# Patient Record
Sex: MALE | Race: WHITE | NOT HISPANIC OR LATINO | Employment: UNEMPLOYED | URBAN - METROPOLITAN AREA
[De-identification: names, ages, dates, MRNs, and addresses within clinical notes are randomized per-mention and may not be internally consistent; named-entity substitution may affect disease eponyms.]

---

## 2018-06-09 ENCOUNTER — HOSPITAL ENCOUNTER (EMERGENCY)
Facility: HOSPITAL | Age: 38
Discharge: PSYCHIATRIC OR SUBSTANCE USE FACILITY | End: 2018-06-09
Attending: EMERGENCY MEDICINE
Payer: COMMERCIAL

## 2018-06-09 VITALS
DIASTOLIC BLOOD PRESSURE: 79 MMHG | HEART RATE: 59 BPM | HEIGHT: 72 IN | SYSTOLIC BLOOD PRESSURE: 136 MMHG | OXYGEN SATURATION: 99 % | TEMPERATURE: 98 F | RESPIRATION RATE: 18 BRPM | WEIGHT: 170 LBS | BODY MASS INDEX: 23.03 KG/M2

## 2018-06-09 DIAGNOSIS — R45.851 SUICIDAL IDEATIONS: Primary | ICD-10-CM

## 2018-06-09 DIAGNOSIS — F19.10 SUBSTANCE ABUSE: ICD-10-CM

## 2018-06-09 PROBLEM — F32.2 SEVERE DEPRESSION: Status: ACTIVE | Noted: 2018-06-09

## 2018-06-09 LAB
ALBUMIN SERPL BCP-MCNC: 4.4 G/DL
ALP SERPL-CCNC: 93 U/L
ALT SERPL W/O P-5'-P-CCNC: 24 U/L
AMPHET+METHAMPHET UR QL: NORMAL
ANION GAP SERPL CALC-SCNC: 8 MMOL/L
APAP SERPL-MCNC: <3 UG/ML
AST SERPL-CCNC: 19 U/L
BARBITURATES UR QL SCN>200 NG/ML: NEGATIVE
BASOPHILS # BLD AUTO: 0.05 K/UL
BASOPHILS NFR BLD: 0.6 %
BENZODIAZ UR QL SCN>200 NG/ML: NEGATIVE
BILIRUB SERPL-MCNC: 0.7 MG/DL
BILIRUB UR QL STRIP: NEGATIVE
BUN SERPL-MCNC: 9 MG/DL
BZE UR QL SCN: NEGATIVE
CALCIUM SERPL-MCNC: 9.7 MG/DL
CANNABINOIDS UR QL SCN: NEGATIVE
CHLORIDE SERPL-SCNC: 103 MMOL/L
CLARITY UR REFRACT.AUTO: CLEAR
CO2 SERPL-SCNC: 29 MMOL/L
COLOR UR AUTO: YELLOW
CREAT SERPL-MCNC: 0.9 MG/DL
CREAT UR-MCNC: 204 MG/DL
DIFFERENTIAL METHOD: ABNORMAL
EOSINOPHIL # BLD AUTO: 0.2 K/UL
EOSINOPHIL NFR BLD: 2 %
ERYTHROCYTE [DISTWIDTH] IN BLOOD BY AUTOMATED COUNT: 12.6 %
EST. GFR  (AFRICAN AMERICAN): >60 ML/MIN/1.73 M^2
EST. GFR  (NON AFRICAN AMERICAN): >60 ML/MIN/1.73 M^2
ETHANOL SERPL-MCNC: <10 MG/DL
GLUCOSE SERPL-MCNC: 91 MG/DL
GLUCOSE UR QL STRIP: NEGATIVE
HCT VFR BLD AUTO: 50.2 %
HGB BLD-MCNC: 17.1 G/DL
HGB UR QL STRIP: NEGATIVE
IMM GRANULOCYTES # BLD AUTO: 0.03 K/UL
IMM GRANULOCYTES NFR BLD AUTO: 0.3 %
KETONES UR QL STRIP: NEGATIVE
LEUKOCYTE ESTERASE UR QL STRIP: NEGATIVE
LYMPHOCYTES # BLD AUTO: 2.1 K/UL
LYMPHOCYTES NFR BLD: 24.4 %
MCH RBC QN AUTO: 32.3 PG
MCHC RBC AUTO-ENTMCNC: 34.1 G/DL
MCV RBC AUTO: 95 FL
METHADONE UR QL SCN>300 NG/ML: NEGATIVE
MONOCYTES # BLD AUTO: 0.4 K/UL
MONOCYTES NFR BLD: 4.3 %
NEUTROPHILS # BLD AUTO: 5.9 K/UL
NEUTROPHILS NFR BLD: 68.4 %
NITRITE UR QL STRIP: NEGATIVE
NRBC BLD-RTO: 0 /100 WBC
OPIATES UR QL SCN: NEGATIVE
PCP UR QL SCN>25 NG/ML: NEGATIVE
PH UR STRIP: 6 [PH] (ref 5–8)
PLATELET # BLD AUTO: 296 K/UL
PMV BLD AUTO: 9.7 FL
POTASSIUM SERPL-SCNC: 4.3 MMOL/L
PROT SERPL-MCNC: 7.8 G/DL
PROT UR QL STRIP: NEGATIVE
RBC # BLD AUTO: 5.3 M/UL
SODIUM SERPL-SCNC: 140 MMOL/L
SP GR UR STRIP: 1.02 (ref 1–1.03)
TOXICOLOGY INFORMATION: NORMAL
TSH SERPL DL<=0.005 MIU/L-ACNC: 1.83 UIU/ML
URN SPEC COLLECT METH UR: NORMAL
UROBILINOGEN UR STRIP-ACNC: NEGATIVE EU/DL
WBC # BLD AUTO: 8.65 K/UL

## 2018-06-09 PROCEDURE — 99285 EMERGENCY DEPT VISIT HI MDM: CPT

## 2018-06-09 PROCEDURE — 80053 COMPREHEN METABOLIC PANEL: CPT

## 2018-06-09 PROCEDURE — 25000003 PHARM REV CODE 250: Performed by: PHYSICIAN ASSISTANT

## 2018-06-09 PROCEDURE — 85025 COMPLETE CBC W/AUTO DIFF WBC: CPT

## 2018-06-09 PROCEDURE — 80320 DRUG SCREEN QUANTALCOHOLS: CPT

## 2018-06-09 PROCEDURE — 80329 ANALGESICS NON-OPIOID 1 OR 2: CPT

## 2018-06-09 PROCEDURE — S4991 NICOTINE PATCH NONLEGEND: HCPCS | Performed by: PHYSICIAN ASSISTANT

## 2018-06-09 PROCEDURE — 80307 DRUG TEST PRSMV CHEM ANLYZR: CPT

## 2018-06-09 PROCEDURE — 99285 EMERGENCY DEPT VISIT HI MDM: CPT | Mod: ,,, | Performed by: PHYSICIAN ASSISTANT

## 2018-06-09 PROCEDURE — 81003 URINALYSIS AUTO W/O SCOPE: CPT | Mod: 59

## 2018-06-09 PROCEDURE — 84443 ASSAY THYROID STIM HORMONE: CPT

## 2018-06-09 RX ORDER — IBUPROFEN 200 MG
1 TABLET ORAL
Status: DISCONTINUED | OUTPATIENT
Start: 2018-06-09 | End: 2018-06-09 | Stop reason: HOSPADM

## 2018-06-09 RX ADMIN — NICOTINE 1 PATCH: 21 PATCH, EXTENDED RELEASE TRANSDERMAL at 04:06

## 2018-06-09 NOTE — ED PROVIDER NOTES
"Encounter Date: 6/9/2018       History     Chief Complaint   Patient presents with    Suicidal     plan to hang self     Patient is a 38-year-old male with past medical history of ADHD no longer on Vyvanse for approximately 6 months who presents the ED for suicidal ideations and plan.  Patient states the past 3 days, he has been having thoughts of suicidal ideation and plan.  He states that he planned on hanging himself from the beam in his house where no one would be able to get hime.  He was brought in by his wife and his father.  His wife states that they agree to bring him in today because his thoughts were very "lucid".  The patient has been off of Vyvanse for approximately 6 months but does admit to previously abusing it. As a replacement, he started abusing crystal meth. In addition, he recently lost his job and has been at home for the past week. He states that he sleeps all the time. Wife is aware of patient's recently mood change, and patient agreed to come into the hospital for help due to his worsening active thoughts. He had been verbal abusive to his wife due to his mood change and states that it isn't fair to her. No homicidal ideations. History of ETOH abuse, but none currently. Patient denies any physical pain such as headache, chest pain, back pain, or abdominal pain, vomiting, diarrhea.           Review of patient's allergies indicates:  No Known Allergies  Past Medical History:   Diagnosis Date    Addiction to drug     ADHD (attention deficit hyperactivity disorder), inattentive type     Treated by Dr. Tao in past - records in media file    History of psychiatric hospitalization     Withdrawal symptoms, alcohol      Past Surgical History:   Procedure Laterality Date    CYST REMOVAL      from around the left ear    HERNIA REPAIR      left inguinal herna    WISDOM TOOTH EXTRACTION       Family History   Problem Relation Age of Onset    Cancer Mother 50        skin cancer    Cancer " Father 45        bladder cancer x4    Muscular dystrophy Father 42    No Known Problems Sister     No Known Problems Sister      Social History   Substance Use Topics    Smoking status: Current Every Day Smoker     Packs/day: 2.00     Years: 15.00     Types: Cigarettes    Smokeless tobacco: Never Used    Alcohol use No      Comment: socially     Review of Systems   Constitutional: Negative for chills and fever.   HENT: Negative for ear pain and sore throat.    Eyes: Negative for visual disturbance.   Respiratory: Negative for cough and shortness of breath.    Cardiovascular: Negative for chest pain and leg swelling.   Gastrointestinal: Negative for abdominal pain, nausea and vomiting.   Endocrine: Negative for polyuria.   Genitourinary: Negative for dysuria and hematuria.   Musculoskeletal: Negative for back pain.   Skin: Negative for rash.   Neurological: Negative for weakness.   Hematological: Does not bruise/bleed easily.   Psychiatric/Behavioral: Positive for behavioral problems, dysphoric mood, sleep disturbance (hypersomnia) and suicidal ideas. Negative for hallucinations and self-injury.       Physical Exam     Initial Vitals [06/09/18 1315]   BP Pulse Resp Temp SpO2   (!) 141/75 67 18 97.9 °F (36.6 °C) 99 %      MAP       97         Physical Exam    Nursing note and vitals reviewed.  Constitutional: He appears well-developed and well-nourished. He is not diaphoretic. No distress.   Pleasant male in NAD and nontoxic.   HENT:   Head: Normocephalic and atraumatic.   Nose: Nose normal.   Eyes: Conjunctivae and EOM are normal.   Neck: Normal range of motion.   Cardiovascular: Normal rate, regular rhythm and normal heart sounds. Exam reveals no friction rub.    No murmur heard.  Pulmonary/Chest: Breath sounds normal. No respiratory distress. He has no wheezes. He has no rales.   Abdominal: Soft. Bowel sounds are normal. He exhibits no distension. There is no tenderness.   Musculoskeletal: Normal range of  motion.   Neurological: He is alert and oriented to person, place, and time. He has normal strength. No sensory deficit.   Skin: Skin is warm and dry. No erythema.   Psychiatric: He has a normal mood and affect. His speech is not slurred. He is not agitated, not slowed and not actively hallucinating. Thought content is not paranoid and not delusional. Cognition and memory are not impaired. He does not express impulsivity. He expresses suicidal ideation. He expresses no homicidal ideation. He expresses suicidal plans. He expresses no homicidal plans.   Tearful at times. Calm and cooperative. Patient appears very honest.          ED Course   Procedures  Labs Reviewed   CBC W/ AUTO DIFFERENTIAL - Abnormal; Notable for the following:        Result Value    MCH 32.3 (*)     All other components within normal limits   ACETAMINOPHEN LEVEL - Abnormal; Notable for the following:     Acetaminophen (Tylenol), Serum <3.0 (*)     All other components within normal limits   COMPREHENSIVE METABOLIC PANEL   TSH   ALCOHOL,MEDICAL (ETHANOL)   URINALYSIS, REFLEX TO URINE CULTURE    Narrative:     Preferred Collection Type->Urine, Clean Catch   DRUG SCREEN PANEL, URINE EMERGENCY    Narrative:     Preferred Collection Type->Urine, Clean Catch          No orders to display        Medical Decision Making:   History:   Old Medical Records: I decided to obtain old medical records.  Clinical Tests:   Lab Tests: Reviewed and Ordered       APC / Resident Notes:   Patient is a 38-year-old male presents the ED with suicide ideations and plan.  Patient states that he had clear and very detailed thought about hanging himself today from the beam in his house.  Patient's wife and father is with him at this time.    On exam, vital signs stable.  NAD and nontoxic appearing.  Normal mood and affect.  Patient is very honest.  Tearful at times when discussing his thoughts.  Suicidal ideations and plan.  No homicidal ideations, hallucinations, or  paranoia. Clinically sober.     Will PEC patient, order routine blood work, and planned to call Psychiatry as they may have a bed open.    UA with no signs of infection.  CBC with no leukocytosis.    CMP with no elec abnormalities. Cr stable at 0.9. No hyperbilirubinemia or transaminitis.   TSH wnl at 1.8.  Negative APAP level.  Negative ethanol level.    Drug screen presumptive positive for amphetamine.  He reports last use of crystal meth 1 week ago.    2:29 PM  Patient medically cleared. Will consult psychiatry.    3:04 PM  No beds availible in psych unit here. Will find placement and transfer. Patient prefers to be somewhere near home like the new [facility].    Update: patient transferred to [facility].                 Clinical Impression:   The primary encounter diagnosis was Suicidal ideations. A diagnosis of Substance abuse was also pertinent to this visit.      Disposition:   Disposition: Transferred                        Grace Bonds PA-C  06/10/18 0923

## 2018-06-09 NOTE — ED NOTES
Pt updated on plan of care. Pt told he has been accepted to [facility]. All questions answered.

## 2018-06-09 NOTE — ED TRIAGE NOTES
"Pt states " I feel like killing myself"  States he had these thoughts yesterday but they were intense today   "

## 2018-06-09 NOTE — ED NOTES
Pt arrives to [facility] escorted by RN and security. Pt arrives with no belongings. Spouse took all pt's belongings. Pt is searched for contraband, none found. Pt is calm, compliant, with flat affect. Pt reports feeling hopeless and irritable with multiple life stressors. Pt denies HI/AV hallucinations. Pt denies paranoia. Pt does report feeling suicidal this morning, states he was consumed with thoughts of self harm. Pt willingly came to ED with spouse. Pt denies other medical hx or psych hx. States he was taking vyvanse up until a few months ago for ADHD. Pt states he began to abuse it so he took himself of it, then started doing crystal meth. Pt states he has not done crystal meth in weeks. Denies other drug use or significant alcohol abuse. States he smokes 1.5 packs cigarettes a day. Pt is educated on plan of care and PEC process. All questions answered. Jesscia from 's office made aware of room change.

## 2018-06-09 NOTE — ED NOTES
Kishor Baer is at bedside and is charting on every 15 min observation form Kishor does not have personal belongings with her.

## 2018-06-09 NOTE — ED NOTES
[facility] transportation scheduled through [facility]  ETA is 1815.

## 2018-06-09 NOTE — ED NOTES
Pt escorted to [facility] vehicle by RN and security. All pertinent paperwork given to [facility] drivers. Pt does not have any belongings with him. Pt requested RN to notify Spouse Yue of transfer. Yue is notified by [facility] and is given [facility] phone number. Pt is calm, compliant, no signs of distress. Jessica from coroners notified of transfer. Paolo from [facility] notified of departure.

## 2018-06-09 NOTE — ED NOTES
Pt has been accepted to [facility] per Leslie. Pt has been accepted under the care of Vandana Marie NP. [facility] nurse to call report in 20 minutes to [phone].

## 2018-06-09 NOTE — ED NOTES
PEC packet faxed to [facility], [facility] and [facility]. [facility] intake reports no male beds open.

## 2018-06-09 NOTE — ED NOTES
Pt states he used crystal meth one week ago States he recently had a change in employment , had a mild confrontation with his original employer and tried to get hired by another company but it did not work out. States had not taken his Vyvance in 6 months and abused it  He then substituted his Vyvance for crystal meth. States he has been depressed while coming off of the cyrstal meth , gets verbally abusive towards his wife, and feels useless.

## 2018-06-09 NOTE — ED NOTES
Pt identifiers Reynaldo Pinto III were checked and are correct  LOC: The patient is awake, alert, aware of environment Pt appears tearful at times  Oriented x4 , speaking appropriately  APPEARANCE: Pt resting comfortably, in no acute distress, pt is clean and well groomed, clothing properly fastened  SKIN: Skin warm, dry and intact, normal skin turgor, moist mucus membranes  RESPIRATORY: Airway is open and patent, respirations are spontaneous, even and unlabored, normal effort and rate  Breath sounds clear roseanna to all lung fields on auscultation   CARDIAC: Normal rate and rhythm, no peripheral edema noted, capillary refill < 3 seconds, bilateral radial pulses 2+  ABDOMEN: Soft, nontender, nondistended. Bowel sounds present to all four quad of abd on auscultation  NEUROLOGIC: PERRL, facial expression is symmetrical, patient moving all extremities spontaneously, normal sensation in all extremities when touched with a finger.  Follows all commands appropriately  MUSCULOSKELETAL: No obvious deformities.

## 2018-06-11 PROBLEM — S61.200A OPEN WOUND OF RIGHT INDEX FINGER WITHOUT DAMAGE TO NAIL: Status: ACTIVE | Noted: 2018-06-11

## 2018-06-11 PROBLEM — F17.200 NICOTINE DEPENDENCE: Status: ACTIVE | Noted: 2018-06-11

## 2020-06-16 ENCOUNTER — OFFICE VISIT (OUTPATIENT)
Dept: INTERNAL MEDICINE | Facility: CLINIC | Age: 40
End: 2020-06-16
Payer: COMMERCIAL

## 2020-06-16 VITALS
HEIGHT: 72 IN | HEART RATE: 88 BPM | WEIGHT: 186 LBS | DIASTOLIC BLOOD PRESSURE: 78 MMHG | BODY MASS INDEX: 25.19 KG/M2 | OXYGEN SATURATION: 98 % | SYSTOLIC BLOOD PRESSURE: 120 MMHG

## 2020-06-16 DIAGNOSIS — Z72.0 TOBACCO USER: ICD-10-CM

## 2020-06-16 DIAGNOSIS — Z11.4 ENCOUNTER FOR SCREENING FOR HIV: ICD-10-CM

## 2020-06-16 DIAGNOSIS — Z01.89 ROUTINE LAB DRAW: ICD-10-CM

## 2020-06-16 DIAGNOSIS — Z63.0 MARITAL PROBLEM: ICD-10-CM

## 2020-06-16 DIAGNOSIS — Z13.220 SCREENING CHOLESTEROL LEVEL: ICD-10-CM

## 2020-06-16 DIAGNOSIS — E66.3 OVERWEIGHT (BMI 25.0-29.9): ICD-10-CM

## 2020-06-16 DIAGNOSIS — Z23 NEED FOR 23-POLYVALENT PNEUMOCOCCAL POLYSACCHARIDE VACCINE: ICD-10-CM

## 2020-06-16 DIAGNOSIS — Z00.00 ENCOUNTER FOR HEALTH MAINTENANCE EXAMINATION: Primary | ICD-10-CM

## 2020-06-16 PROCEDURE — 99999 PR PBB SHADOW E&M-EST. PATIENT-LVL IV: CPT | Mod: PBBFAC,,, | Performed by: NURSE PRACTITIONER

## 2020-06-16 PROCEDURE — 90471 IMMUNIZATION ADMIN: CPT | Mod: S$GLB,,, | Performed by: NURSE PRACTITIONER

## 2020-06-16 PROCEDURE — 99385 PR PREVENTIVE VISIT,NEW,18-39: ICD-10-PCS | Mod: 25,S$GLB,, | Performed by: NURSE PRACTITIONER

## 2020-06-16 PROCEDURE — 90732 PPSV23 VACC 2 YRS+ SUBQ/IM: CPT | Mod: S$GLB,,, | Performed by: NURSE PRACTITIONER

## 2020-06-16 PROCEDURE — 90471 PNEUMOCOCCAL POLYSACCHARIDE VACCINE 23-VALENT =>2YO SQ IM: ICD-10-PCS | Mod: S$GLB,,, | Performed by: NURSE PRACTITIONER

## 2020-06-16 PROCEDURE — 99999 PR PBB SHADOW E&M-EST. PATIENT-LVL IV: ICD-10-PCS | Mod: PBBFAC,,, | Performed by: NURSE PRACTITIONER

## 2020-06-16 PROCEDURE — 90732 PNEUMOCOCCAL POLYSACCHARIDE VACCINE 23-VALENT =>2YO SQ IM: ICD-10-PCS | Mod: S$GLB,,, | Performed by: NURSE PRACTITIONER

## 2020-06-16 PROCEDURE — 99385 PREV VISIT NEW AGE 18-39: CPT | Mod: 25,S$GLB,, | Performed by: NURSE PRACTITIONER

## 2020-06-16 SDOH — SOCIAL DETERMINANTS OF HEALTH (SDOH): PROBLEMS IN RELATIONSHIP WITH SPOUSE OR PARTNER: Z63.0

## 2020-06-16 NOTE — PATIENT INSTRUCTIONS
Fasting labs ordered and drawn today, will call with results, if results ok, RTC in 1 yr for annual or sooner prn with one of MDs I work with who can be your new PCP: Dr. Mary Anne Rob, Dr. Portillo Hernandez, Dr. Keyona Jimenez, Dr. Von Shah, Dr. Jarred Lezama, Dr. Betito Sprague    PPSV 23 vaccine today    Referral to behavioral health to discuss marital issues

## 2020-06-16 NOTE — PROGRESS NOTES
"Subjective:       Patient ID: Reynaldo Pinto III is a 40 y.o. male.    Chief Complaint: Annual Exam    Pt new to me, here for "overall health".    Previous  PCP was someone in [place] he states.    Requesting a referral to psychiatry for his marital issues.    There is a hx of depression and substance abuse, he has been clean for 14 months and not having issues with this.    He stopped smoking 3 weeks ago, using nicotine patches.    Review of Systems   Constitutional: Negative for activity change, appetite change and unexpected weight change.   HENT: Negative for hearing loss and voice change.    Eyes: Negative for visual disturbance.   Respiratory: Negative for apnea, cough, chest tightness and shortness of breath.    Cardiovascular: Negative for chest pain, palpitations and leg swelling.   Gastrointestinal: Negative for abdominal distention, abdominal pain, blood in stool, constipation, diarrhea, nausea and vomiting.   Endocrine: Negative for cold intolerance, heat intolerance, polydipsia, polyphagia and polyuria.   Genitourinary: Negative for difficulty urinating, dysuria and penile pain.   Musculoskeletal: Negative for arthralgias and myalgias.   Integumentary:  Negative for color change, pallor, rash and wound.   Allergic/Immunologic: Negative for environmental allergies and food allergies.   Neurological: Negative for dizziness and weakness.   Hematological: Negative for adenopathy. Does not bruise/bleed easily.   Psychiatric/Behavioral: Negative for agitation, behavioral problems, sleep disturbance and suicidal ideas.     Review of patient's allergies indicates:  No Known Allergies      Current Outpatient Medications:     nicotine (NICODERM CQ) 21 mg/24 hr, Place 1 patch onto the skin once daily., Disp: , Rfl:     Patient Active Problem List   Diagnosis    Erectile dysfunction    ADHD (attention deficit hyperactivity disorder), inattentive type    Tobacco user    Severe depression    Open wound of right " index finger without damage to nail    Nicotine dependence     Past Medical History:   Diagnosis Date    Addiction to drug     ADHD (attention deficit hyperactivity disorder), inattentive type     Treated by Dr. Tao in past - records in media file    History of psychiatric hospitalization     Withdrawal symptoms, alcohol      Past Surgical History:   Procedure Laterality Date    CYST REMOVAL      from around the left ear    HERNIA REPAIR      left inguinal herna    WISDOM TOOTH EXTRACTION       Social History     Socioeconomic History    Marital status:      Spouse name: Not on file    Number of children: Not on file    Years of education: Not on file    Highest education level: Not on file   Occupational History    Not on file   Social Needs    Financial resource strain: Not on file    Food insecurity     Worry: Not on file     Inability: Not on file    Transportation needs     Medical: Not on file     Non-medical: Not on file   Tobacco Use    Smoking status: Former Smoker     Packs/day: 2.00     Years: 15.00     Pack years: 30.00     Types: Cigarettes     Quit date: 2020     Years since quittin.0    Smokeless tobacco: Never Used   Substance and Sexual Activity    Alcohol use: No     Alcohol/week: 0.0 standard drinks     Comment: socially    Drug use: Not Currently     Types: Methamphetamines     Comment: clean 18 months    Sexual activity: Yes     Partners: Female   Lifestyle    Physical activity     Days per week: Not on file     Minutes per session: Not on file    Stress: Not on file   Relationships    Social connections     Talks on phone: Not on file     Gets together: Not on file     Attends Holiness service: Not on file     Active member of club or organization: Not on file     Attends meetings of clubs or organizations: Not on file     Relationship status: Not on file   Other Topics Concern    Patient feels they ought to cut down on drinking/drug use Not Asked     Patient annoyed by others criticizing their drinking/drug use Not Asked    Patient has felt bad or guilty about drinking/drug use Not Asked    Patient has had a drink/used drugs as an eye opener in the AM Not Asked   Social History Narrative    Not on file     Family History   Problem Relation Age of Onset    Cancer Mother 50        skin cancer    Cancer Father 45        bladder cancer x4    Muscular dystrophy Father 42    No Known Problems Sister     No Known Problems Sister        Objective:       Vitals:    06/16/20 1528   BP: 120/78   Pulse: 88   SpO2: 98%   Weight: 84.4 kg (186 lb)   Height: 6' (1.829 m)   PainSc: 0-No pain     Body mass index is 25.23 kg/m².    Physical Exam  Vitals signs and nursing note reviewed.   Constitutional:       Appearance: Normal appearance. He is well-developed.      Comments: overweight   HENT:      Head: Normocephalic.      Right Ear: Tympanic membrane, ear canal and external ear normal.      Left Ear: Tympanic membrane, ear canal and external ear normal.      Nose: Nose normal.      Mouth/Throat:      Mouth: Mucous membranes are moist.      Pharynx: Oropharynx is clear.   Eyes:      General: Lids are normal. Lids are everted, no foreign bodies appreciated.      Conjunctiva/sclera: Conjunctivae normal.      Pupils: Pupils are equal, round, and reactive to light.   Neck:      Musculoskeletal: Full passive range of motion without pain, normal range of motion and neck supple.      Vascular: No carotid bruit or JVD.      Trachea: Trachea normal.   Cardiovascular:      Rate and Rhythm: Normal rate and regular rhythm.      Pulses: Normal pulses.      Heart sounds: Normal heart sounds.   Pulmonary:      Effort: Pulmonary effort is normal.      Breath sounds: Normal breath sounds.   Abdominal:      General: Abdomen is flat. Bowel sounds are normal.      Palpations: Abdomen is soft.   Musculoskeletal: Normal range of motion.   Skin:     General: Skin is warm and dry.       Capillary Refill: Capillary refill takes less than 2 seconds.   Neurological:      General: No focal deficit present.      Mental Status: He is alert and oriented to person, place, and time.   Psychiatric:         Mood and Affect: Mood normal.         Speech: Speech normal.         Behavior: Behavior normal.         Thought Content: Thought content normal.         Judgment: Judgment normal.         Assessment:       1. Encounter for health maintenance examination    2. Routine lab draw    3. Screening cholesterol level    4. Encounter for screening for HIV    5. Need for 23-polyvalent pneumococcal polysaccharide vaccine    6. Tobacco user    7. BMI 25.0-25.9,adult    8. Overweight (BMI 25.0-29.9)    9. Marital problem        Plan:       Reynaldo was seen today for annual exam.    Diagnoses and all orders for this visit:    Encounter for health maintenance examination  Annual wellness exam completed.    All medications, histories, and concerns reviewed, reconciled, and addressed.    Appropriate Screenings per pt's sex and age have been reviewed and discussed with pt.    BMI reviewed.    Routine lab draw  -     CBC auto differential; Future  -     Comprehensive metabolic panel; Future  -     Lipid Panel; Future  -     HIV 1/2 Ag/Ab (4th Gen); Future  -     TSH; Future  -     Urinalysis; Future    Screening cholesterol level  -     Lipid Panel; Future    Encounter for screening for HIV  -     HIV 1/2 Ag/Ab (4th Gen); Future    Need for 23-polyvalent pneumococcal polysaccharide vaccine  -     (In Office Administered) Pneumococcal Polysaccharide Vaccine (23 Valent) (SQ/IM)    Tobacco user  -     (In Office Administered) Pneumococcal Polysaccharide Vaccine (23 Valent) (SQ/IM)    BMI 25.0-25.9,adult    Overweight (BMI 25.0-29.9)    Marital problem  -     Ambulatory referral/consult to Behavioral Health; Future    Fasting labs ordered and drawn today, will call with results, if results ok, RTC in 1 yr for annual or sooner prn  with one of MDs I work with who can be your new PCP: Dr. Mary Anne Rob, Dr. Portillo Hernandez, Dr. Keyona Jimenez, Dr. Von Shah, Dr. Jarred Lezama, Dr. Betito Sprague    PPSV 23 vaccine today    Referral to behavioral health to discuss marital issues    Follow up in about 1 year (around 6/16/2021) for for annual or sooner as needed with one of MDs recommended on AVS.

## 2020-06-16 NOTE — LETTER
June 16, 2020      [facility]  [address]  [facility] [address]  Phone: [phone]  Fax: [phone]       Patient: Reynaldo Pinto   YOB: 1980  Date of Visit: 06/16/2020    To Whom It May Concern:    Michael Pinto  was at [facility] on 06/16/2020. He may return to work/school on 06/18/2020 with no restrictions. If you have any questions or concerns, or if I can be of further assistance, please do not hesitate to contact me.    Sincerely,    Kia Navarro DNP

## 2020-06-17 ENCOUNTER — LAB VISIT (OUTPATIENT)
Dept: LAB | Facility: HOSPITAL | Age: 40
End: 2020-06-17
Attending: NURSE PRACTITIONER
Payer: COMMERCIAL

## 2020-06-17 DIAGNOSIS — Z01.89 ROUTINE LAB DRAW: ICD-10-CM

## 2020-06-17 LAB
BILIRUB UR QL STRIP: NEGATIVE
CLARITY UR REFRACT.AUTO: CLEAR
COLOR UR AUTO: YELLOW
GLUCOSE UR QL STRIP: NEGATIVE
HGB UR QL STRIP: NEGATIVE
KETONES UR QL STRIP: NEGATIVE
LEUKOCYTE ESTERASE UR QL STRIP: NEGATIVE
NITRITE UR QL STRIP: NEGATIVE
PH UR STRIP: 6 [PH] (ref 5–8)
PROT UR QL STRIP: NEGATIVE
SP GR UR STRIP: 1.03 (ref 1–1.03)
URN SPEC COLLECT METH UR: NORMAL

## 2020-06-17 PROCEDURE — 81003 URINALYSIS AUTO W/O SCOPE: CPT

## 2020-06-19 ENCOUNTER — TELEPHONE (OUTPATIENT)
Dept: INTERNAL MEDICINE | Facility: CLINIC | Age: 40
End: 2020-06-19

## 2020-06-19 NOTE — TELEPHONE ENCOUNTER
Left patient a voicemail letting him know that check out would have been the ones to give him a list of behavioral health doctors. Advised him to call when he is ready to schedule his first appt as order is already in from last visit.

## 2021-04-12 ENCOUNTER — PATIENT MESSAGE (OUTPATIENT)
Dept: RESEARCH | Facility: HOSPITAL | Age: 41
End: 2021-04-12

## 2022-01-25 ENCOUNTER — DOCUMENTATION ONLY (OUTPATIENT)
Dept: INTERNAL MEDICINE | Facility: CLINIC | Age: 42
End: 2022-01-25

## 2022-01-25 ENCOUNTER — OFFICE VISIT (OUTPATIENT)
Dept: INTERNAL MEDICINE | Facility: CLINIC | Age: 42
End: 2022-01-25
Payer: COMMERCIAL

## 2022-01-25 VITALS
BODY MASS INDEX: 26.78 KG/M2 | OXYGEN SATURATION: 97 % | DIASTOLIC BLOOD PRESSURE: 74 MMHG | HEART RATE: 80 BPM | SYSTOLIC BLOOD PRESSURE: 102 MMHG | TEMPERATURE: 99 F | HEIGHT: 72 IN | WEIGHT: 197.75 LBS

## 2022-01-25 DIAGNOSIS — R31.9 HEMATURIA, UNSPECIFIED TYPE: ICD-10-CM

## 2022-01-25 DIAGNOSIS — L70.9 ACNE, UNSPECIFIED ACNE TYPE: ICD-10-CM

## 2022-01-25 DIAGNOSIS — Z11.59 SCREENING FOR VIRAL DISEASE: ICD-10-CM

## 2022-01-25 DIAGNOSIS — N52.9 ERECTILE DYSFUNCTION, UNSPECIFIED ERECTILE DYSFUNCTION TYPE: ICD-10-CM

## 2022-01-25 DIAGNOSIS — R19.7 DIARRHEA, UNSPECIFIED TYPE: ICD-10-CM

## 2022-01-25 DIAGNOSIS — K92.1 HEMATOCHEZIA: ICD-10-CM

## 2022-01-25 DIAGNOSIS — Z00.00 ANNUAL PHYSICAL EXAM: Primary | ICD-10-CM

## 2022-01-25 PROCEDURE — 3078F PR MOST RECENT DIASTOLIC BLOOD PRESSURE < 80 MM HG: ICD-10-PCS | Mod: CPTII,S$GLB,, | Performed by: INTERNAL MEDICINE

## 2022-01-25 PROCEDURE — 99999 PR PBB SHADOW E&M-EST. PATIENT-LVL V: CPT | Mod: PBBFAC,,, | Performed by: INTERNAL MEDICINE

## 2022-01-25 PROCEDURE — 1160F PR REVIEW ALL MEDS BY PRESCRIBER/CLIN PHARMACIST DOCUMENTED: ICD-10-PCS | Mod: CPTII,S$GLB,, | Performed by: INTERNAL MEDICINE

## 2022-01-25 PROCEDURE — 3074F SYST BP LT 130 MM HG: CPT | Mod: CPTII,S$GLB,, | Performed by: INTERNAL MEDICINE

## 2022-01-25 PROCEDURE — 3078F DIAST BP <80 MM HG: CPT | Mod: CPTII,S$GLB,, | Performed by: INTERNAL MEDICINE

## 2022-01-25 PROCEDURE — 99999 PR PBB SHADOW E&M-EST. PATIENT-LVL V: ICD-10-PCS | Mod: PBBFAC,,, | Performed by: INTERNAL MEDICINE

## 2022-01-25 PROCEDURE — 3008F PR BODY MASS INDEX (BMI) DOCUMENTED: ICD-10-PCS | Mod: CPTII,S$GLB,, | Performed by: INTERNAL MEDICINE

## 2022-01-25 PROCEDURE — 99395 PREV VISIT EST AGE 18-39: CPT | Mod: S$GLB,,, | Performed by: INTERNAL MEDICINE

## 2022-01-25 PROCEDURE — 1159F PR MEDICATION LIST DOCUMENTED IN MEDICAL RECORD: ICD-10-PCS | Mod: CPTII,S$GLB,, | Performed by: INTERNAL MEDICINE

## 2022-01-25 PROCEDURE — 99395 PR PREVENTIVE VISIT,EST,18-39: ICD-10-PCS | Mod: S$GLB,,, | Performed by: INTERNAL MEDICINE

## 2022-01-25 PROCEDURE — 3074F PR MOST RECENT SYSTOLIC BLOOD PRESSURE < 130 MM HG: ICD-10-PCS | Mod: CPTII,S$GLB,, | Performed by: INTERNAL MEDICINE

## 2022-01-25 PROCEDURE — 1159F MED LIST DOCD IN RCRD: CPT | Mod: CPTII,S$GLB,, | Performed by: INTERNAL MEDICINE

## 2022-01-25 PROCEDURE — 1160F RVW MEDS BY RX/DR IN RCRD: CPT | Mod: CPTII,S$GLB,, | Performed by: INTERNAL MEDICINE

## 2022-01-25 PROCEDURE — 3008F BODY MASS INDEX DOCD: CPT | Mod: CPTII,S$GLB,, | Performed by: INTERNAL MEDICINE

## 2022-01-25 RX ORDER — TADALAFIL 5 MG/1
5 TABLET ORAL DAILY PRN
Qty: 30 TABLET | Refills: 3 | Status: SHIPPED | OUTPATIENT
Start: 2022-01-25 | End: 2022-01-25 | Stop reason: SDUPTHER

## 2022-01-25 RX ORDER — TADALAFIL 5 MG/1
5 TABLET ORAL DAILY PRN
COMMUNITY
End: 2022-01-25 | Stop reason: SDUPTHER

## 2022-01-25 RX ORDER — TADALAFIL 5 MG/1
5 TABLET ORAL DAILY PRN
Qty: 30 TABLET | Refills: 3 | Status: SHIPPED | OUTPATIENT
Start: 2022-01-25 | End: 2022-07-27 | Stop reason: SDUPTHER

## 2022-01-25 NOTE — PROGRESS NOTES
[facility] Note    Chief Complaint      Chief Complaint   Patient presents with    Establish Care       History of Present Illness      Reynaldo Pinto III is a 41 y.o. male with chronic conditions of erectile dysfunction who presents today for: establish care and annual preventative visit. Complains of loose bowel movements 2-3x/day, usually after eating, occasionally with blood.  Not associated with abdominal pain.  Has history of bladder cancer.    ED: Controlled on cialis.    Diet: Prepares own food mostly.  Limiting foods and carbs.  Drinks plenty water.  Exercise: Stays active with work as [employer]  Denies drinking and driving, drinking more than 4 drinks on occasion, drug use.    Flu shot declines.  TdAP 2017. COVID vaccine declines.  Shingrix due age 52.  Pneumonia vaccine due age 67.  Cscope and PSA due age 47.    Past Medical History:  Past Medical History:   Diagnosis Date    Addiction to drug     ADHD (attention deficit hyperactivity disorder), inattentive type     Treated by Dr. Tao in past - records in media file    History of psychiatric hospitalization     Withdrawal symptoms, alcohol        Past Surgical History:   has a past surgical history that includes Cyst Removal; Hernia repair; and [place] tooth extraction.    Family History:  family history includes Cancer (age of onset: 45) in his father; Cancer (age of onset: 50) in his mother; Muscular dystrophy (age of onset: 42) in his father; No Known Problems in his sister and sister.     Social History:  Social History     Tobacco Use    Smoking status: Former Smoker     Packs/day: 2.00     Years: 15.00     Pack years: 30.00     Types: Cigarettes     Quit date: 2020     Years since quittin.6    Smokeless tobacco: Never Used   Substance Use Topics    Alcohol use: No     Alcohol/week: 0.0 standard drinks     Comment: socially    Drug use: Not Currently     Types: Methamphetamines     Comment: clean 18  months       I personally reviewed all past medical, surgical, social and family history.    Review of Systems   Constitutional: Negative for chills, fever and malaise/fatigue.   Respiratory: Negative for shortness of breath.    Cardiovascular: Negative for chest pain.   Gastrointestinal: Negative for constipation, diarrhea, nausea and vomiting.   Skin: Negative for rash.   Neurological: Negative for weakness.   All other systems reviewed and are negative.       Medications:  Outpatient Encounter Medications as of 1/25/2022   Medication Sig Dispense Refill    [DISCONTINUED] tadalafiL (CIALIS) 5 MG tablet Take 5 mg by mouth daily as needed for Erectile Dysfunction.      [DISCONTINUED] tadalafiL (CIALIS) 5 MG tablet Take 1 tablet (5 mg total) by mouth daily as needed for Erectile Dysfunction. 30 tablet 3    nicotine (NICODERM CQ) 21 mg/24 hr Place 1 patch onto the skin once daily. (Patient not taking: Reported on 1/25/2022)      tadalafiL (CIALIS) 5 MG tablet Take 1 tablet (5 mg total) by mouth daily as needed for Erectile Dysfunction. 30 tablet 3     No facility-administered encounter medications on file as of 1/25/2022.       Allergies:  Review of patient's allergies indicates:  No Known Allergies    Health Maintenance:  Immunization History   Administered Date(s) Administered    Hepatitis A / Hepatitis B 12/14/2018    Influenza - Quadrivalent - PF *Preferred* (6 months and older) 09/20/2016    Meningococcal Conjugate (MCV4P) 12/01/2008, 12/31/2008    Pneumococcal Polysaccharide - 23 Valent 06/16/2020    Td (ADULT) 12/01/2008    Tdap 12/31/2008, 05/17/2017      Health Maintenance   Topic Date Due    Hepatitis C Screening  Never done    Lipid Panel  06/17/2025    TETANUS VACCINE  01/01/2029        Physical Exam      Vital Signs  Temp: 98.7 °F (37.1 °C)  Pulse: 80  SpO2: 97 %  BP: 102/74  BP Location: Left arm  Patient Position: Sitting  Pain Score: 0-No pain  Height and Weight  Height: 6' (182.9  cm)  Weight: 89.7 kg (197 lb 12 oz)  BSA (Calculated - sq m): 2.13 sq meters  BMI (Calculated): 26.8  Weight in (lb) to have BMI = 25: 183.9]    Physical Exam  Vitals reviewed.   Constitutional:       Appearance: He is well-developed and well-nourished.   HENT:      Head: Normocephalic and atraumatic.      Right Ear: External ear normal.      Left Ear: External ear normal.      Mouth/Throat:      Mouth: Oropharynx is clear and moist.   Cardiovascular:      Rate and Rhythm: Normal rate and regular rhythm.      Heart sounds: Normal heart sounds. No murmur heard.      Pulmonary:      Effort: Pulmonary effort is normal.      Breath sounds: Normal breath sounds. No wheezing or rales.   Abdominal:      General: Bowel sounds are normal.      Palpations: Abdomen is soft.          Laboratory:  CBC:  Recent Labs   Lab 06/17/20  0711   WBC 7.62   RBC 5.09   Hemoglobin 16.1   Hematocrit 48.3   Platelets 271   MCV 95   MCH 31.6 H   MCHC 33.3     CMP:  Recent Labs   Lab 06/17/20  0711   Glucose 98   Calcium 9.5   Albumin 4.0   Total Protein 7.3   Sodium 140   Potassium 4.5   CO2 24   Chloride 107   BUN 12   Alkaline Phosphatase 90   ALT 28   AST 21   Total Bilirubin 0.7     URINALYSIS:  Recent Labs   Lab 06/17/20  0713   Color, UA Yellow   Specific Gravity, UA 1.030   pH, UA 6.0   Protein, UA Negative   Nitrite, UA Negative   Leukocytes, UA Negative      LIPIDS:  Recent Labs   Lab 06/17/20  0711   TSH 2.405   HDL 41   Cholesterol 198   Triglycerides 155 H   LDL Cholesterol 126.0   HDL/Cholesterol Ratio 20.7   Non-HDL Cholesterol 157   Total Cholesterol/HDL Ratio 4.8     TSH:  Recent Labs   Lab 06/17/20  0711   TSH 2.405     A1C:        Assessment/Plan     Reynaldo Pinto III is a 41 y.o.male with:    1. Annual physical exam  - CBC Auto Differential; Future  - Comprehensive Metabolic Panel; Future  - Lipid Panel; Future  - TSH; Future  - T4, Free; Future  - Hepatitis C Antibody; Future  - URINALYSIS; Future  Discussed diet and  exercise, vaccines and cancer screening, risk factors.  Screening labs ordered.     2. Erectile dysfunction, unspecified erectile dysfunction type  - tadalafiL (CIALIS) 5 MG tablet; Take 1 tablet (5 mg total) by mouth daily as needed for Erectile Dysfunction.  Dispense: 30 tablet; Refill: 3  Continue current meds.    3. Diarrhea, unspecified type  - Fecal Immunochemical Test (iFOBT); Future  - Ambulatory referral/consult to Gastroenterology; Future  4. Hematochezia  - Fecal Immunochemical Test (iFOBT); Future  - Ambulatory referral/consult to Gastroenterology; Future  Check labs and refer to GI  5. Hematuria, unspecified type  - URINALYSIS; Future  Check labs  6. Acne, unspecified acne type  - Ambulatory referral/consult to Dermatology; Future  Refer to derm  7. Screening for viral disease  - Hepatitis C Antibody; Future       Chronic conditions status updated as per HPI.  Other than changes above, cont current medications and maintain follow up with specialists.  Follow up in about 1 year (around 1/25/2023) for Annual preventative visit.    Future Appointments   Date Time Provider Department Center   1/29/2022  8:40 AM LAB, APPOINTMENT [facility] IM Jose Daniel [facility]   1/29/2022  8:50 AM LAB, APPOINTMENT [facility]  Jose Daniel Heredia MD  [facility]

## 2022-01-29 ENCOUNTER — LAB VISIT (OUTPATIENT)
Dept: LAB | Facility: HOSPITAL | Age: 42
End: 2022-01-29
Attending: INTERNAL MEDICINE
Payer: COMMERCIAL

## 2022-01-29 DIAGNOSIS — Z00.00 ANNUAL PHYSICAL EXAM: ICD-10-CM

## 2022-01-29 DIAGNOSIS — Z11.59 SCREENING FOR VIRAL DISEASE: ICD-10-CM

## 2022-01-29 LAB
ALBUMIN SERPL BCP-MCNC: 4.2 G/DL (ref 3.5–5.2)
ALP SERPL-CCNC: 82 U/L (ref 55–135)
ALT SERPL W/O P-5'-P-CCNC: 39 U/L (ref 10–44)
ANION GAP SERPL CALC-SCNC: 9 MMOL/L (ref 8–16)
AST SERPL-CCNC: 25 U/L (ref 10–40)
BASOPHILS # BLD AUTO: 0.05 K/UL (ref 0–0.2)
BASOPHILS NFR BLD: 1 % (ref 0–1.9)
BILIRUB SERPL-MCNC: 1 MG/DL (ref 0.1–1)
BUN SERPL-MCNC: 12 MG/DL (ref 6–20)
CALCIUM SERPL-MCNC: 9.2 MG/DL (ref 8.7–10.5)
CHLORIDE SERPL-SCNC: 104 MMOL/L (ref 95–110)
CHOLEST SERPL-MCNC: 184 MG/DL (ref 120–199)
CHOLEST/HDLC SERPL: 4.5 {RATIO} (ref 2–5)
CO2 SERPL-SCNC: 24 MMOL/L (ref 23–29)
CREAT SERPL-MCNC: 0.8 MG/DL (ref 0.5–1.4)
DIFFERENTIAL METHOD: ABNORMAL
EOSINOPHIL # BLD AUTO: 0.1 K/UL (ref 0–0.5)
EOSINOPHIL NFR BLD: 2.7 % (ref 0–8)
ERYTHROCYTE [DISTWIDTH] IN BLOOD BY AUTOMATED COUNT: 12.7 % (ref 11.5–14.5)
EST. GFR  (AFRICAN AMERICAN): >60 ML/MIN/1.73 M^2
EST. GFR  (NON AFRICAN AMERICAN): >60 ML/MIN/1.73 M^2
GLUCOSE SERPL-MCNC: 104 MG/DL (ref 70–110)
HCT VFR BLD AUTO: 49.5 % (ref 40–54)
HDLC SERPL-MCNC: 41 MG/DL (ref 40–75)
HDLC SERPL: 22.3 % (ref 20–50)
HGB BLD-MCNC: 16.6 G/DL (ref 14–18)
IMM GRANULOCYTES # BLD AUTO: 0.03 K/UL (ref 0–0.04)
IMM GRANULOCYTES NFR BLD AUTO: 0.6 % (ref 0–0.5)
LDLC SERPL CALC-MCNC: 112.8 MG/DL (ref 63–159)
LYMPHOCYTES # BLD AUTO: 1.9 K/UL (ref 1–4.8)
LYMPHOCYTES NFR BLD: 36.3 % (ref 18–48)
MCH RBC QN AUTO: 31.2 PG (ref 27–31)
MCHC RBC AUTO-ENTMCNC: 33.5 G/DL (ref 32–36)
MCV RBC AUTO: 93 FL (ref 82–98)
MONOCYTES # BLD AUTO: 0.4 K/UL (ref 0.3–1)
MONOCYTES NFR BLD: 7.4 % (ref 4–15)
NEUTROPHILS # BLD AUTO: 2.7 K/UL (ref 1.8–7.7)
NEUTROPHILS NFR BLD: 52 % (ref 38–73)
NONHDLC SERPL-MCNC: 143 MG/DL
NRBC BLD-RTO: 0 /100 WBC
PLATELET # BLD AUTO: 262 K/UL (ref 150–450)
PMV BLD AUTO: 10.4 FL (ref 9.2–12.9)
POTASSIUM SERPL-SCNC: 4.4 MMOL/L (ref 3.5–5.1)
PROT SERPL-MCNC: 7.4 G/DL (ref 6–8.4)
RBC # BLD AUTO: 5.32 M/UL (ref 4.6–6.2)
SODIUM SERPL-SCNC: 137 MMOL/L (ref 136–145)
T4 FREE SERPL-MCNC: 0.89 NG/DL (ref 0.71–1.51)
TRIGL SERPL-MCNC: 151 MG/DL (ref 30–150)
TSH SERPL DL<=0.005 MIU/L-ACNC: 1.96 UIU/ML (ref 0.4–4)
WBC # BLD AUTO: 5.13 K/UL (ref 3.9–12.7)

## 2022-01-29 PROCEDURE — 85025 COMPLETE CBC W/AUTO DIFF WBC: CPT | Performed by: INTERNAL MEDICINE

## 2022-01-29 PROCEDURE — 84443 ASSAY THYROID STIM HORMONE: CPT | Performed by: INTERNAL MEDICINE

## 2022-01-29 PROCEDURE — 80061 LIPID PANEL: CPT | Performed by: INTERNAL MEDICINE

## 2022-01-29 PROCEDURE — 86803 HEPATITIS C AB TEST: CPT | Performed by: INTERNAL MEDICINE

## 2022-01-29 PROCEDURE — 84439 ASSAY OF FREE THYROXINE: CPT | Performed by: INTERNAL MEDICINE

## 2022-01-29 PROCEDURE — 80053 COMPREHEN METABOLIC PANEL: CPT | Performed by: INTERNAL MEDICINE

## 2022-01-29 PROCEDURE — 36415 COLL VENOUS BLD VENIPUNCTURE: CPT | Performed by: INTERNAL MEDICINE

## 2022-02-01 LAB — HCV AB SERPL QL IA: NEGATIVE

## 2022-02-02 ENCOUNTER — PATIENT MESSAGE (OUTPATIENT)
Dept: INTERNAL MEDICINE | Facility: CLINIC | Age: 42
End: 2022-02-02
Payer: COMMERCIAL

## 2022-02-23 ENCOUNTER — TELEPHONE (OUTPATIENT)
Dept: SURGERY | Facility: CLINIC | Age: 42
End: 2022-02-23
Payer: COMMERCIAL

## 2022-02-24 ENCOUNTER — OFFICE VISIT (OUTPATIENT)
Dept: SURGERY | Facility: CLINIC | Age: 42
End: 2022-02-24
Payer: COMMERCIAL

## 2022-02-24 VITALS
DIASTOLIC BLOOD PRESSURE: 77 MMHG | WEIGHT: 196.44 LBS | SYSTOLIC BLOOD PRESSURE: 125 MMHG | BODY MASS INDEX: 26.64 KG/M2 | HEART RATE: 72 BPM

## 2022-02-24 DIAGNOSIS — R19.7 DIARRHEA, UNSPECIFIED TYPE: ICD-10-CM

## 2022-02-24 DIAGNOSIS — K64.5 THROMBOSED HEMORRHOIDS: Primary | ICD-10-CM

## 2022-02-24 DIAGNOSIS — K92.1 HEMATOCHEZIA: ICD-10-CM

## 2022-02-24 PROCEDURE — 1160F PR REVIEW ALL MEDS BY PRESCRIBER/CLIN PHARMACIST DOCUMENTED: ICD-10-PCS | Mod: CPTII,S$GLB,, | Performed by: NURSE PRACTITIONER

## 2022-02-24 PROCEDURE — 99203 OFFICE O/P NEW LOW 30 MIN: CPT | Mod: S$GLB,,, | Performed by: NURSE PRACTITIONER

## 2022-02-24 PROCEDURE — 99203 PR OFFICE/OUTPT VISIT, NEW, LEVL III, 30-44 MIN: ICD-10-PCS | Mod: S$GLB,,, | Performed by: NURSE PRACTITIONER

## 2022-02-24 PROCEDURE — 3074F PR MOST RECENT SYSTOLIC BLOOD PRESSURE < 130 MM HG: ICD-10-PCS | Mod: CPTII,S$GLB,, | Performed by: NURSE PRACTITIONER

## 2022-02-24 PROCEDURE — 3078F PR MOST RECENT DIASTOLIC BLOOD PRESSURE < 80 MM HG: ICD-10-PCS | Mod: CPTII,S$GLB,, | Performed by: NURSE PRACTITIONER

## 2022-02-24 PROCEDURE — 99999 PR PBB SHADOW E&M-EST. PATIENT-LVL III: CPT | Mod: PBBFAC,,, | Performed by: NURSE PRACTITIONER

## 2022-02-24 PROCEDURE — 3008F BODY MASS INDEX DOCD: CPT | Mod: CPTII,S$GLB,, | Performed by: NURSE PRACTITIONER

## 2022-02-24 PROCEDURE — 3074F SYST BP LT 130 MM HG: CPT | Mod: CPTII,S$GLB,, | Performed by: NURSE PRACTITIONER

## 2022-02-24 PROCEDURE — 1159F MED LIST DOCD IN RCRD: CPT | Mod: CPTII,S$GLB,, | Performed by: NURSE PRACTITIONER

## 2022-02-24 PROCEDURE — 3078F DIAST BP <80 MM HG: CPT | Mod: CPTII,S$GLB,, | Performed by: NURSE PRACTITIONER

## 2022-02-24 PROCEDURE — 3008F PR BODY MASS INDEX (BMI) DOCUMENTED: ICD-10-PCS | Mod: CPTII,S$GLB,, | Performed by: NURSE PRACTITIONER

## 2022-02-24 PROCEDURE — 99999 PR PBB SHADOW E&M-EST. PATIENT-LVL III: ICD-10-PCS | Mod: PBBFAC,,, | Performed by: NURSE PRACTITIONER

## 2022-02-24 PROCEDURE — 1160F RVW MEDS BY RX/DR IN RCRD: CPT | Mod: CPTII,S$GLB,, | Performed by: NURSE PRACTITIONER

## 2022-02-24 PROCEDURE — 1159F PR MEDICATION LIST DOCUMENTED IN MEDICAL RECORD: ICD-10-PCS | Mod: CPTII,S$GLB,, | Performed by: NURSE PRACTITIONER

## 2022-02-24 RX ORDER — HYDROCORTISONE 25 MG/G
CREAM TOPICAL 2 TIMES DAILY
Qty: 28 G | Refills: 2 | Status: SHIPPED | OUTPATIENT
Start: 2022-02-24

## 2022-02-24 NOTE — PATIENT INSTRUCTIONS
Daily fiber supplement  Any psyllium; Common brands are Metamucil, Citrucel, Konsyl  Anusol (Hydrocortisone) cream 2x/day for 2 weeks  If no improvement, message via [facility] [place] and will order colonoscopy at that time

## 2022-02-24 NOTE — PROGRESS NOTES
CRS Office Visit History and Physical    Referring Md:   Tiago Heredia Md  [address]  [address] [place] [address]    SUBJECTIVE:     Chief Complaint: blood in stool    History of Present Illness:  The patient is new patient to this practice.   Course is as follows:  Patient is a 42 y.o. male with presents with bloody stools. With bm's. Denies pain with bm, abdominal pain, or weight loss.   Symptoms have been present for years intermittently. Hx of opioid use with constipation followed by stimulant use and diarrhea.  Has tried nothing.  Associated bleeding: yes  Previous anorectal procedures: no  confirms straining/prolonged time on toilet with bowel movements.  is not currently taking fiber supplement or stool softener. Having approx 3 bm/day of loose stools. Usually right after eating.   Blood thinners: No    Last Colonoscopy: none; -FIT 3 wks ago.  Family history of colorectal cancer or IBD: none; fam hx of bladder cancer    Review of patient's allergies indicates:  No Known Allergies    Past Medical History:   Diagnosis Date    Addiction to drug     ADHD (attention deficit hyperactivity disorder), inattentive type     Treated by Dr. Tao in past - records in media file    History of psychiatric hospitalization     Withdrawal symptoms, alcohol      Past Surgical History:   Procedure Laterality Date    CYST REMOVAL      from around the left ear    HERNIA REPAIR      left inguinal herna    WISDOM TOOTH EXTRACTION       Family History   Problem Relation Age of Onset    Cancer Mother 50        skin cancer    Cancer Father 45        bladder cancer x4    Muscular dystrophy Father 42    No Known Problems Sister     No Known Problems Sister      Social History     Tobacco Use    Smoking status: Former Smoker     Packs/day: 2.00     Years: 15.00     Pack years: 30.00     Types: Cigarettes     Quit date: 2020     Years since quittin.7    Smokeless tobacco: Never Used    Substance Use Topics    Alcohol use: No     Alcohol/week: 0.0 standard drinks     Comment: socially    Drug use: Not Currently     Types: Methamphetamines     Comment: clean 18 months        Review of Systems:  Review of Systems   Constitutional: Negative for weight loss.   Gastrointestinal: Positive for blood in stool and diarrhea. Negative for abdominal pain, constipation and melena.       OBJECTIVE:     Vital Signs (Most Recent)  Blood Pressure 125/77 (BP Location: Right arm, Patient Position: Sitting, BP Method: Large (Automatic))   Pulse 72   Weight 89.1 kg (196 lb 6.9 oz)   Body Mass Index 26.64 kg/m²     Physical Exam:  General: White male in no distress   Neuro: Alert and oriented to person, place, and time.  Moves all extremities.     HEENT: No icterus.  Trachea midline  Respiratory: Respirations are even and unlabored, no cough or audible wheezing  Skin: Warm dry and intact, No visible rashes, no jaundice    Labs reviewed today:  Lab Results   Component Value Date    WBC 5.13 01/29/2022    HGB 16.6 01/29/2022    HCT 49.5 01/29/2022     01/29/2022    CHOL 184 01/29/2022    TRIG 151 (H) 01/29/2022    HDL 41 01/29/2022    ALT 39 01/29/2022    AST 25 01/29/2022     01/29/2022    K 4.4 01/29/2022     01/29/2022    CREATININE 0.8 01/29/2022    BUN 12 01/29/2022    CO2 24 01/29/2022    TSH 1.963 01/29/2022       Imaging reviewed today:  none    Endoscopy reviewed today:  none    Anorectal Exam:    Anal Skin: thrombosed hemorrhoids x2    Digital Rectal Exam:  deferred    ASSESSMENT/PLAN:     Diagnoses and all orders for this visit:    Thrombosed hemorrhoids  -     hydrocortisone (ANUSOL-HC) 2.5 % rectal cream; Place rectally 2 (two) times daily.    Hematochezia  -     Ambulatory referral/consult to Gastroenterology  -     hydrocortisone (ANUSOL-HC) 2.5 % rectal cream; Place rectally 2 (two) times daily.    Diarrhea, unspecified type  -     Ambulatory referral/consult to  Gastroenterology  -     hydrocortisone (ANUSOL-HC) 2.5 % rectal cream; Place rectally 2 (two) times daily.        The patient was instructed to:  anusol cream 2xday for 2 weeks  Increase water intake to at least 8-10 glasses of water per day.  Take a daily fiber supplement (Konsyl, Benefiber, Metamucil) and increase dietary intake to 20-30 grams/day.  Avoid straining or spending >5min/event with bowel movements.  Follow-up in clinic in 6-8 weeks if no improvement, will order colonscopy at that time.      Barbara Pinto, TARSHAP-C  Colon and Rectal Surgery

## 2022-04-19 ENCOUNTER — OFFICE VISIT (OUTPATIENT)
Dept: DERMATOLOGY | Facility: CLINIC | Age: 42
End: 2022-04-19
Payer: COMMERCIAL

## 2022-04-19 DIAGNOSIS — Z12.83 SCREENING EXAM FOR SKIN CANCER: ICD-10-CM

## 2022-04-19 DIAGNOSIS — D22.9 MULTIPLE BENIGN NEVI: ICD-10-CM

## 2022-04-19 DIAGNOSIS — L57.8 ACTINIC SKIN DAMAGE: ICD-10-CM

## 2022-04-19 DIAGNOSIS — L81.4 SOLAR LENTIGO: Primary | ICD-10-CM

## 2022-04-19 DIAGNOSIS — D18.01 CHERRY ANGIOMA: ICD-10-CM

## 2022-04-19 DIAGNOSIS — L82.1 SK (SEBORRHEIC KERATOSIS): ICD-10-CM

## 2022-04-19 PROCEDURE — 99999 PR PBB SHADOW E&M-EST. PATIENT-LVL II: CPT | Mod: PBBFAC,,, | Performed by: PATHOLOGY

## 2022-04-19 PROCEDURE — 99203 OFFICE O/P NEW LOW 30 MIN: CPT | Mod: S$GLB,,, | Performed by: PATHOLOGY

## 2022-04-19 PROCEDURE — 99203 PR OFFICE/OUTPT VISIT, NEW, LEVL III, 30-44 MIN: ICD-10-PCS | Mod: S$GLB,,, | Performed by: PATHOLOGY

## 2022-04-19 PROCEDURE — 1160F RVW MEDS BY RX/DR IN RCRD: CPT | Mod: CPTII,S$GLB,, | Performed by: PATHOLOGY

## 2022-04-19 PROCEDURE — 1160F PR REVIEW ALL MEDS BY PRESCRIBER/CLIN PHARMACIST DOCUMENTED: ICD-10-PCS | Mod: CPTII,S$GLB,, | Performed by: PATHOLOGY

## 2022-04-19 PROCEDURE — 1159F MED LIST DOCD IN RCRD: CPT | Mod: CPTII,S$GLB,, | Performed by: PATHOLOGY

## 2022-04-19 PROCEDURE — 1159F PR MEDICATION LIST DOCUMENTED IN MEDICAL RECORD: ICD-10-PCS | Mod: CPTII,S$GLB,, | Performed by: PATHOLOGY

## 2022-04-19 PROCEDURE — 99999 PR PBB SHADOW E&M-EST. PATIENT-LVL II: ICD-10-PCS | Mod: PBBFAC,,, | Performed by: PATHOLOGY

## 2022-04-19 NOTE — PROGRESS NOTES
Subjective:       Patient ID:  Reynaldo Pinto III is a 42 y.o. male who presents for   Chief Complaint   Patient presents with    Skin Check     TBSE     HPI  Pt with no personal h/o melanoma, dysplastic nevi or NMSC.  Works outdoors and gets significant sun exposure.  + family history of NMSC.    Review of Systems   Constitutional: Negative for fever, chills, fatigue and malaise.   Skin: Positive for activity-related sunscreen use, recent sunburn and wears hat. Negative for itching, rash and daily sunscreen use.        Objective:    Physical Exam   Constitutional: He appears well-developed and well-nourished.   Neurological: He is alert.   Skin:   Areas Examined (abnormalities noted in diagram):   Scalp / Hair Palpated and Inspected  Head / Face Inspection Performed  Neck Inspection Performed  Chest / Axilla Inspection Performed  Abdomen Inspection Performed  Genitals / Buttocks / Groin Inspection Performed  Back Inspection Performed  RUE Inspected  LUE Inspection Performed  RLE Inspected  LLE Inspection Performed  Nails and Digits Inspection Performed                   Diagram Legend     Erythematous scaling macule/papule c/w actinic keratosis       Vascular papule c/w angioma      Pigmented verrucoid papule/plaque c/w seborrheic keratosis      Yellow umbilicated papule c/w sebaceous hyperplasia      Irregularly shaped tan macule c/w lentigo     1-2 mm smooth white papules consistent with Milia      Movable subcutaneous cyst with punctum c/w epidermal inclusion cyst      Subcutaneous movable cyst c/w pilar cyst      Firm pink to brown papule c/w dermatofibroma      Pedunculated fleshy papule(s) c/w skin tag(s)      Evenly pigmented macule c/w junctional nevus     Mildly variegated pigmented, slightly irregular-bordered macule c/w mildly atypical nevus      Flesh colored to evenly pigmented papule c/w intradermal nevus       Pink pearly papule/plaque c/w basal cell carcinoma      Erythematous hyperkeratotic  cursted plaque c/w SCC      Surgical scar with no sign of skin cancer recurrence      Open and closed comedones      Inflammatory papules and pustules      Verrucoid papule consistent consistent with wart     Erythematous eczematous patches and plaques     Dystrophic onycholytic nail with subungual debris c/w onychomycosis     Umbilicated papule    Erythematous-base heme-crusted tan verrucoid plaque consistent with inflamed seborrheic keratosis     Erythematous Silvery Scaling Plaque c/w Psoriasis     See annotation      Assessment / Plan:        Solar lentigo - This is a benign hyperpigmented sun induced lesion. Recommend daily sun protection/avoidance and use of at least SPF 30, broad spectrum sunscreen (OTC drug) will reduce the number of new lesions. Treatment of these benign lesions are considered cosmetic.      Screening exam for skin cancer  -     Ambulatory referral/consult to Dermatology    Total body skin examination performed today including at least 12 points as noted in physical examination. No lesions suspicious for malignancy noted.    Recommend daily sun protection/avoidance, use of at least SPF 30, broad spectrum sunscreen (OTC drug), skin self examinations, and routine physician surveillance to optimize early detection      SK (seborrheic keratosis) - These are benign inherited growths without a malignant potential. Reassurance given to patient. No treatment is necessary.       Multiple benign nevi - Patient with a few benign appearing nevi. Instructed patient to observe lesion(s) for changes and follow up in clinic if changes are noted.      Cherry angioma - This is a benign vascular lesion. Reassurance given. No treatment required.       Actinic skin damage - Patient instructed in importance in daily sun protection of at least spf 30. Sun avoidance and topical protection discussed.     Recommend La Roche Posay melt in milk for daily use on face and neck.    Patient encouraged to wear hat for all  outdoor exposure.     Also discussed sun protective clothing.                 No follow-ups on file.

## 2022-07-28 DIAGNOSIS — N52.9 ERECTILE DYSFUNCTION, UNSPECIFIED ERECTILE DYSFUNCTION TYPE: ICD-10-CM

## 2022-07-28 RX ORDER — TADALAFIL 5 MG/1
5 TABLET ORAL DAILY PRN
Qty: 30 TABLET | Refills: 3 | Status: SHIPPED | OUTPATIENT
Start: 2022-07-28 | End: 2022-09-19

## 2022-12-25 DIAGNOSIS — N52.9 ERECTILE DYSFUNCTION, UNSPECIFIED ERECTILE DYSFUNCTION TYPE: ICD-10-CM

## 2022-12-25 NOTE — TELEPHONE ENCOUNTER
No new care gaps identified.  [facility] Embedded Care Gaps. Reference number: [id number]. 12/25/2022   3:13:21 PM CST

## 2022-12-26 RX ORDER — TADALAFIL 5 MG/1
5 TABLET ORAL DAILY PRN
Qty: 30 TABLET | Refills: 0 | Status: SHIPPED | OUTPATIENT
Start: 2022-12-26 | End: 2023-02-05 | Stop reason: SDUPTHER

## 2023-02-09 DIAGNOSIS — N52.9 ERECTILE DYSFUNCTION, UNSPECIFIED ERECTILE DYSFUNCTION TYPE: ICD-10-CM

## 2023-02-09 NOTE — TELEPHONE ENCOUNTER
No new care gaps identified.  [facility] Embedded Care Gaps. Reference number: [id number]. 2/09/2023   5:15:03 PM CST

## 2023-02-10 RX ORDER — TADALAFIL 5 MG/1
5 TABLET ORAL DAILY PRN
Qty: 30 TABLET | Refills: 2 | OUTPATIENT
Start: 2023-02-10

## 2023-06-09 ENCOUNTER — OFFICE VISIT (OUTPATIENT)
Dept: PRIMARY CARE CLINIC | Facility: CLINIC | Age: 43
End: 2023-06-09
Payer: COMMERCIAL

## 2023-06-09 VITALS
DIASTOLIC BLOOD PRESSURE: 80 MMHG | SYSTOLIC BLOOD PRESSURE: 118 MMHG | WEIGHT: 198.19 LBS | OXYGEN SATURATION: 98 % | HEIGHT: 72 IN | BODY MASS INDEX: 26.84 KG/M2 | HEART RATE: 107 BPM

## 2023-06-09 DIAGNOSIS — Z00.00 ANNUAL PHYSICAL EXAM: Primary | ICD-10-CM

## 2023-06-09 DIAGNOSIS — R40.0 DAYTIME SOMNOLENCE: ICD-10-CM

## 2023-06-09 DIAGNOSIS — R06.83 SNORING: ICD-10-CM

## 2023-06-09 DIAGNOSIS — N52.9 ERECTILE DYSFUNCTION, UNSPECIFIED ERECTILE DYSFUNCTION TYPE: ICD-10-CM

## 2023-06-09 DIAGNOSIS — F90.0 ADHD (ATTENTION DEFICIT HYPERACTIVITY DISORDER), INATTENTIVE TYPE: ICD-10-CM

## 2023-06-09 PROBLEM — S61.200A OPEN WOUND OF RIGHT INDEX FINGER WITHOUT DAMAGE TO NAIL: Status: RESOLVED | Noted: 2018-06-11 | Resolved: 2023-06-09

## 2023-06-09 PROBLEM — F32.2 SEVERE DEPRESSION: Status: RESOLVED | Noted: 2018-06-09 | Resolved: 2023-06-09

## 2023-06-09 PROCEDURE — 3074F SYST BP LT 130 MM HG: CPT | Mod: CPTII,S$GLB,, | Performed by: INTERNAL MEDICINE

## 2023-06-09 PROCEDURE — 99999 PR PBB SHADOW E&M-EST. PATIENT-LVL III: ICD-10-PCS | Mod: PBBFAC,,, | Performed by: INTERNAL MEDICINE

## 2023-06-09 PROCEDURE — 99396 PREV VISIT EST AGE 40-64: CPT | Mod: S$GLB,,, | Performed by: INTERNAL MEDICINE

## 2023-06-09 PROCEDURE — 3079F DIAST BP 80-89 MM HG: CPT | Mod: CPTII,S$GLB,, | Performed by: INTERNAL MEDICINE

## 2023-06-09 PROCEDURE — 99396 PR PREVENTIVE VISIT,EST,40-64: ICD-10-PCS | Mod: S$GLB,,, | Performed by: INTERNAL MEDICINE

## 2023-06-09 PROCEDURE — 3079F PR MOST RECENT DIASTOLIC BLOOD PRESSURE 80-89 MM HG: ICD-10-PCS | Mod: CPTII,S$GLB,, | Performed by: INTERNAL MEDICINE

## 2023-06-09 PROCEDURE — 1159F MED LIST DOCD IN RCRD: CPT | Mod: CPTII,S$GLB,, | Performed by: INTERNAL MEDICINE

## 2023-06-09 PROCEDURE — 3074F PR MOST RECENT SYSTOLIC BLOOD PRESSURE < 130 MM HG: ICD-10-PCS | Mod: CPTII,S$GLB,, | Performed by: INTERNAL MEDICINE

## 2023-06-09 PROCEDURE — 1159F PR MEDICATION LIST DOCUMENTED IN MEDICAL RECORD: ICD-10-PCS | Mod: CPTII,S$GLB,, | Performed by: INTERNAL MEDICINE

## 2023-06-09 PROCEDURE — 3008F PR BODY MASS INDEX (BMI) DOCUMENTED: ICD-10-PCS | Mod: CPTII,S$GLB,, | Performed by: INTERNAL MEDICINE

## 2023-06-09 PROCEDURE — 99999 PR PBB SHADOW E&M-EST. PATIENT-LVL III: CPT | Mod: PBBFAC,,, | Performed by: INTERNAL MEDICINE

## 2023-06-09 PROCEDURE — 3008F BODY MASS INDEX DOCD: CPT | Mod: CPTII,S$GLB,, | Performed by: INTERNAL MEDICINE

## 2023-06-09 RX ORDER — TADALAFIL 5 MG/1
5 TABLET ORAL DAILY PRN
Qty: 30 TABLET | Refills: 11 | Status: SHIPPED | OUTPATIENT
Start: 2023-06-09 | End: 2023-08-07 | Stop reason: SDUPTHER

## 2023-06-09 NOTE — PROGRESS NOTES
[facility] Note    Chief Complaint      Chief Complaint   Patient presents with    Annual Exam    Snoring       History of Present Illness      Reynaldo Pinto III is a 43 y.o. male with chronic conditions of erectile dysfunction who presents today for: annual preventative visit.  ED: Controlled on cialis.    Diet: Prepares own food mostly.  Limiting foods and carbs.  Drinks plenty water.  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    Flu shot declines.  TdAP 2017. COVID vaccine declines.  Shingrix due age 52.  Pneumonia vaccine due age 67.  Cscope and PSA due age 47.       Past Medical History:  Past Medical History:   Diagnosis Date    Addiction to drug     ADHD (attention deficit hyperactivity disorder), inattentive type     Treated by Dr. Tao in past - records in media file    History of psychiatric hospitalization     Withdrawal symptoms, alcohol        Past Surgical History:   has a past surgical history that includes Cyst Removal; Hernia repair; and [place] tooth extraction.    Family History:  family history includes Cancer (age of onset: 45) in his father; Cancer (age of onset: 50) in his mother; Muscular dystrophy (age of onset: 42) in his father; No Known Problems in his sister and sister.     Social History:  Social History     Tobacco Use    Smoking status: Former     Packs/day: 2.00     Years: 15.00     Pack years: 30.00     Types: Cigarettes     Quit date: 5/25/2020     Years since quitting: 3.0    Smokeless tobacco: Never   Substance Use Topics    Alcohol use: No     Alcohol/week: 0.0 standard drinks     Comment: socially    Drug use: Not Currently     Types: Methamphetamines     Comment: clean 18 months       I personally reviewed all past medical, surgical, social and family history.    Review of Systems   Constitutional:  Negative for chills, fever and malaise/fatigue.   Respiratory:   Negative for shortness of breath.    Cardiovascular:  Negative for chest pain.   Gastrointestinal:  Negative for constipation, diarrhea, nausea and vomiting.   Skin:  Negative for rash.   Neurological:  Negative for weakness.   All other systems reviewed and are negative.     Medications:  Outpatient Encounter Medications as of 6/9/2023   Medication Sig Dispense Refill    hydrocortisone (ANUSOL-HC) 2.5 % rectal cream Place rectally 2 (two) times daily. (Patient not taking: Reported on 6/9/2023) 28 g 2    nicotine (NICODERM CQ) 21 mg/24 hr Place 1 patch onto the skin once daily. (Patient not taking: Reported on 1/25/2022)      tadalafiL (CIALIS) 5 MG tablet Take 1 tablet (5 mg total) by mouth daily as needed for Erectile Dysfunction. 30 tablet 11    [DISCONTINUED] tadalafiL (CIALIS) 5 MG tablet Take 1 tablet (5 mg total) by mouth daily as needed for Erectile Dysfunction. 30 tablet 2     No facility-administered encounter medications on file as of 6/9/2023.       Allergies:  Review of patient's allergies indicates:  No Known Allergies    Health Maintenance:  Immunization History   Administered Date(s) Administered    Hepatitis A / Hepatitis B 12/14/2018    Influenza - Quadrivalent - PF *Preferred* (6 months and older) 09/20/2016    Meningococcal Conjugate (MCV4P) 12/01/2008, 12/31/2008    Pneumococcal Polysaccharide - 23 Valent 06/16/2020    Td (ADULT) 12/01/2008    Tdap 12/31/2008, 05/17/2017      Health Maintenance   Topic Date Due    Lipid Panel  01/29/2027    TETANUS VACCINE  01/01/2029    Hepatitis C Screening  Completed        Physical Exam      Vital Signs  Pulse: 107  SpO2: 98 %  BP: 118/80  BP Location: Left arm  Patient Position: Sitting  Pain Score: 0-No pain  Height and Weight  Height: 6' (182.9 cm)  Weight: 89.9 kg (198 lb 3.1 oz)  BSA (Calculated - sq m): 2.14 sq meters  BMI (Calculated): 26.9  Weight in (lb) to have BMI = 25: 183.9]    Physical Exam  Vitals reviewed.   Constitutional:       Appearance:  He is well-developed.   HENT:      Head: Normocephalic and atraumatic.      Right Ear: External ear normal.      Left Ear: External ear normal.   Cardiovascular:      Rate and Rhythm: Normal rate and regular rhythm.      Heart sounds: Normal heart sounds. No murmur heard.  Pulmonary:      Effort: Pulmonary effort is normal.      Breath sounds: Normal breath sounds. No wheezing or rales.   Abdominal:      General: Bowel sounds are normal.      Palpations: Abdomen is soft.        Laboratory:  CBC:  Recent Labs   Lab 06/17/20  0711 01/29/22  0850   WBC 7.62 5.13   RBC 5.09 5.32   Hemoglobin 16.1 16.6   Hematocrit 48.3 49.5   Platelets 271 262   MCV 95 93   MCH 31.6 H 31.2 H   MCHC 33.3 33.5     CMP:  Recent Labs   Lab 06/17/20  0711 01/29/22  0850   Glucose 98 104   Calcium 9.5 9.2   Albumin 4.0 4.2   Total Protein 7.3 7.4   Sodium 140 137   Potassium 4.5 4.4   CO2 24 24   Chloride 107 104   BUN 12 12   Alkaline Phosphatase 90 82   ALT 28 39   AST 21 25   Total Bilirubin 0.7 1.0     URINALYSIS:  Recent Labs   Lab 01/29/22  0838   Color, UA Yellow   Specific Gravity, UA 1.020   pH, UA 6.0   Protein, UA Negative   Nitrite, UA Negative   Leukocytes, UA Negative      LIPIDS:  Recent Labs   Lab 06/17/20  0711 01/29/22  0850   TSH 2.405 1.963   HDL 41 41   Cholesterol 198 184   Triglycerides 155 H 151 H   LDL Cholesterol 126.0 112.8   HDL/Cholesterol Ratio 20.7 22.3   Non-HDL Cholesterol 157 143   Total Cholesterol/HDL Ratio 4.8 4.5     TSH:  Recent Labs   Lab 06/17/20  0711 01/29/22  0850   TSH 2.405 1.963     A1C:        Assessment/Plan     Reynaldo Pinto III is a 43 y.o.male with:    1. Annual physical exam  Discussed diet and exercise, vaccines and cancer screening, risk factors.  Screening labs deferred.  2. Snoring  - Ambulatory referral/consult to Sleep Disorders; Future  3. Daytime somnolence  - Ambulatory referral/consult to Sleep Disorders; Future  STart with sleep study evaluation and consider ENT  referral.  4. ADHD (attention deficit hyperactivity disorder), inattentive type  Stable  5. Erectile dysfunction, unspecified erectile dysfunction type  - tadalafiL (CIALIS) 5 MG tablet; Take 1 tablet (5 mg total) by mouth daily as needed for Erectile Dysfunction.  Dispense: 30 tablet; Refill: 11  Continue current meds.      Chronic conditions status updated as per HPI.  Other than changes above, cont current medications and maintain follow up with specialists.  No follow-ups on file.    No future appointments.    Tiago Heredia MD  [facility]

## 2023-06-29 ENCOUNTER — OFFICE VISIT (OUTPATIENT)
Dept: SLEEP MEDICINE | Facility: CLINIC | Age: 43
End: 2023-06-29
Payer: COMMERCIAL

## 2023-06-29 VITALS — HEIGHT: 72 IN | WEIGHT: 193 LBS | BODY MASS INDEX: 26.14 KG/M2

## 2023-06-29 DIAGNOSIS — G47.33 OSA (OBSTRUCTIVE SLEEP APNEA): Primary | ICD-10-CM

## 2023-06-29 DIAGNOSIS — F17.200 NICOTINE DEPENDENCE, UNCOMPLICATED, UNSPECIFIED NICOTINE PRODUCT TYPE: ICD-10-CM

## 2023-06-29 DIAGNOSIS — Z72.0 TOBACCO USER: ICD-10-CM

## 2023-06-29 PROCEDURE — 3008F BODY MASS INDEX DOCD: CPT | Mod: CPTII,95,, | Performed by: INTERNAL MEDICINE

## 2023-06-29 PROCEDURE — 1159F MED LIST DOCD IN RCRD: CPT | Mod: CPTII,95,, | Performed by: INTERNAL MEDICINE

## 2023-06-29 PROCEDURE — 1159F PR MEDICATION LIST DOCUMENTED IN MEDICAL RECORD: ICD-10-PCS | Mod: CPTII,95,, | Performed by: INTERNAL MEDICINE

## 2023-06-29 PROCEDURE — 1160F RVW MEDS BY RX/DR IN RCRD: CPT | Mod: CPTII,95,, | Performed by: INTERNAL MEDICINE

## 2023-06-29 PROCEDURE — 99204 OFFICE O/P NEW MOD 45 MIN: CPT | Mod: 95,,, | Performed by: INTERNAL MEDICINE

## 2023-06-29 PROCEDURE — 3008F PR BODY MASS INDEX (BMI) DOCUMENTED: ICD-10-PCS | Mod: CPTII,95,, | Performed by: INTERNAL MEDICINE

## 2023-06-29 PROCEDURE — 99204 PR OFFICE/OUTPT VISIT, NEW, LEVL IV, 45-59 MIN: ICD-10-PCS | Mod: 95,,, | Performed by: INTERNAL MEDICINE

## 2023-06-29 PROCEDURE — 1160F PR REVIEW ALL MEDS BY PRESCRIBER/CLIN PHARMACIST DOCUMENTED: ICD-10-PCS | Mod: CPTII,95,, | Performed by: INTERNAL MEDICINE

## 2023-06-29 NOTE — PATIENT INSTRUCTIONS
Your provider has scheduled you for a sleep study.   You should be receiving a phone call from the sleep lab shortly after your study has been approved by your insurance. Please make sure you have your current phone numbers in the [facility] system. If you do not hear from anyone in the next 10 business days, please call the sleep lab at [phone] to schedule your sleep study. The sleep studies are performed at [facility] seven nights a week.  When you are scheduling your sleep study, they will also make you a follow up appointment with your provider. This follow up appointment will be 10-14 days after your sleep study to review the results. If it is noted that you do have sleep apnea on your initial sleep study, you may receive a call back for a second night study with the CPAP before you come back to the office.

## 2023-06-29 NOTE — PROGRESS NOTES
The patient location is: [facility]  The chief complaint leading to consultation is: Sleep apnea    Visit type: audiovisual    Face to Face time with patient: 9:43 mins  30 minutes of total time spent on the encounter, which includes face to face time and non-face to face time preparing to see the patient (eg, review of tests), Obtaining and/or reviewing separately obtained history, Documenting clinical information in the electronic or other health record, Independently interpreting results (not separately reported) and communicating results to the patient/family/caregiver, or Care coordination (not separately reported).         Each patient to whom he or she provides medical services by telemedicine is:  (1) informed of the relationship between the physician and patient and the respective role of any other health care provider with respect to management of the patient; and (2) notified that he or she may decline to receive medical services by telemedicine and may withdraw from such care at any time.    Notes:                                                 Pulmonary Outpatient  Visit     Subjective:       Patient ID: Reynaldo Pinto III is a 43 y.o. male.    Social History     Tobacco Use   Smoking Status Former    Packs/day: 2.00    Years: 15.00    Pack years: 30.00    Types: Cigarettes, Vaping with nicotine    Start date: 2002    Quit date: 5/25/2020    Years since quitting: 3.0   Smokeless Tobacco Never            Chief Complaint: Apnea      Reynaldo Pinto III is 43 y.o.  Referal for MOE concern  Asked to see by PCP  Excessive snoring and gasping for air  Exhausted in day  Occupation: commercial glass work  Smoker  Bed time: 9 pm: SOL: quick 10 -15 mins  OTC sleep gummies  Wake time: 11 pm  Low energy on week ends  Smart watch detects disrupted sleep  Was in [employer]          Review of Systems   Constitutional:  Positive for fatigue.   Respiratory:  Positive for apnea, snoring and somnolence.     Psychiatric/Behavioral:  Positive for sleep disturbance.    All other systems reviewed and are negative.    Outpatient Encounter Medications as of 6/29/2023   Medication Sig Dispense Refill    tadalafiL (CIALIS) 5 MG tablet Take 1 tablet (5 mg total) by mouth daily as needed for Erectile Dysfunction. 30 tablet 11    hydrocortisone (ANUSOL-HC) 2.5 % rectal cream Place rectally 2 (two) times daily. (Patient not taking: Reported on 6/9/2023) 28 g 2    nicotine (NICODERM CQ) 21 mg/24 hr Place 1 patch onto the skin once daily. (Patient not taking: Reported on 1/25/2022)       No facility-administered encounter medications on file as of 6/29/2023.       The following portions of the patient's history were reviewed and updated as appropriate: He  has a past medical history of Addiction to drug, ADHD (attention deficit hyperactivity disorder), inattentive type, History of psychiatric hospitalization, and Withdrawal symptoms, alcohol.  He does not have any pertinent problems on file.  He  has a past surgical history that includes Cyst Removal; Hernia repair; and [place] tooth extraction.  His family history includes Cancer (age of onset: 45) in his father; Cancer (age of onset: 50) in his mother; Muscular dystrophy (age of onset: 42) in his father; No Known Problems in his sister and sister.  He  reports that he quit smoking about 3 years ago. His smoking use included cigarettes and vaping with nicotine. He started smoking about 21 years ago. He has a 30.00 pack-year smoking history. He has never used smokeless tobacco. He reports that he does not currently use drugs after having used the following drugs: Methamphetamines. He reports that he does not drink alcohol.  He has a current medication list which includes the following prescription(s): tadalafil, hydrocortisone, and nicotine.  Current Outpatient Medications on File Prior to Visit   Medication Sig Dispense Refill    tadalafiL (CIALIS) 5 MG tablet Take 1 tablet (5 mg  total) by mouth daily as needed for Erectile Dysfunction. 30 tablet 11    hydrocortisone (ANUSOL-HC) 2.5 % rectal cream Place rectally 2 (two) times daily. (Patient not taking: Reported on 6/9/2023) 28 g 2    nicotine (NICODERM CQ) 21 mg/24 hr Place 1 patch onto the skin once daily. (Patient not taking: Reported on 1/25/2022)       No current facility-administered medications on file prior to visit.     He has No Known Allergies..      BP Readings from Last 3 Encounters:   06/09/23 118/80   02/24/22 125/77   01/25/22 102/74     Snoring / Sleep:     [place] Questionnaire (validated MOE screening questionnaire)    YES -- Snoring/apnea    YES -- Fatigue    Body mass index is 26.18 kg/m².  (>25 is overweight, >30 is obese)    Blood Pressure = normal blood pressure  (PreHTN 120-139/80-89, Stg1 140-159/90-99, Stg2 >160/>100)  [place] = 2 of three MOE categories are positive (high risk is 2-3 positive categories)     [place] Sleepiness Scale TOTAL =      EPWORTH SLEEPINESS SCALE 6/29/2023   Sitting and reading 0   Watching TV 1   Sitting, inactive in a public place (e.g. a theatre or a meeting) 0   As a passenger in a car for an hour without a break 0   Lying down to rest in the afternoon when circumstances permit 3   Sitting and talking to someone 0   Sitting quietly after a lunch without alcohol 0   In a car, while stopped for a few minutes in traffic 0   Total score 4      (validated sleepiness questionnaire with a higher score indicating greater sleepiness; range 0-24)  No flowsheet data found.    STOP-Bang Questionnaire (validated MOE screening questionnaire)  Negative unless checked off.  [x] Snoring    [x]  Tired/Fatigued/Sleepy  [x] Obstruction (apneas/choking)  [] Pressure (HTN)  [] BMI >35  [] Age >50  [] Neck >40 cm  [x] Gender male   STOP-Bang = 4 (low risk 0-2,high risk 3-8)           MMRC Dyspnea Scale (4 is worst)     [x] MMRC 0: Dyspneic on strenuous excercise (0 points)    [] MMRC 1: Dyspneic on walking a  slight hill (0 points)    [] MMRC 2: Dyspneic on walking level ground; must stop occasionally due to breathlessness (1 point)    [] MMRC 3: Must stop for breathlessness after walking 100 yards or after a few minutes (2 points)    [] MMRC 4: Cannot leave house; breathless on dressing/undressing (3 points)                     EPWORTH SLEEPINESS SCALE 6/9/2023   Sitting and reading 1   Watching TV 1   Sitting, inactive in a public place (e.g. a theatre or a meeting) 0   As a passenger in a car for an hour without a break 0   Lying down to rest in the afternoon when circumstances permit 2   Sitting and talking to someone 0   Sitting quietly after a lunch without alcohol 1   In a car, while stopped for a few minutes in traffic 0   Total score 5                 Objective:     Vital Signs (Most Recent)  Height and Weight  Height: 6' (182.9 cm)  Weight: 87.5 kg (193 lb)  BSA (Calculated - sq m): 2.11 sq meters  BMI (Calculated): 26.2  Weight in (lb) to have BMI = 25: 183.9]  Wt Readings from Last 2 Encounters:   06/29/23 87.5 kg (193 lb)   06/09/23 89.9 kg (198 lb 3.1 oz)       Physical Exam  Vitals and nursing note reviewed.   HENT:      Head: Normocephalic.   Skin:     General: Skin is warm.   Neurological:      Mental Status: He is alert.        Laboratory  Lab Results   Component Value Date    WBC 5.13 01/29/2022    RBC 5.32 01/29/2022    HGB 16.6 01/29/2022    HCT 49.5 01/29/2022    MCV 93 01/29/2022    MCH 31.2 (H) 01/29/2022    MCHC 33.5 01/29/2022    RDW 12.7 01/29/2022     01/29/2022    MPV 10.4 01/29/2022    GRAN 2.7 01/29/2022    GRAN 52.0 01/29/2022    LYMPH 1.9 01/29/2022    LYMPH 36.3 01/29/2022    MONO 0.4 01/29/2022    MONO 7.4 01/29/2022    EOS 0.1 01/29/2022    BASO 0.05 01/29/2022    EOSINOPHIL 2.7 01/29/2022    BASOPHIL 1.0 01/29/2022       BMP  Lab Results   Component Value Date     01/29/2022    K 4.4 01/29/2022     01/29/2022    CO2 24 01/29/2022    BUN 12 01/29/2022    CREATININE  0.8 01/29/2022    CALCIUM 9.2 01/29/2022    ANIONGAP 9 01/29/2022    ESTGFRAFRICA >60.0 01/29/2022    EGFRNONAA >60.0 01/29/2022    AST 25 01/29/2022    ALT 39 01/29/2022    PROT 7.4 01/29/2022          No results found for: IGE     No results found for: ASPERGILLUS  No results found for: AFUMIGATUSCL     No results found for: ACE     Diagnostic Results:  I have personally reviewed today the following studies:    No image results found.       Assessment/Plan:     Problem List Items Addressed This Visit       Tobacco user    Nicotine dependence    Relevant Orders    X-Ray Chest PA And Lateral    MOE (obstructive sleep apnea) - Primary    Relevant Orders    Home Sleep Study      My recommendation at this point would be to set up a HOME SLEEP TEST or INLAB POLYSOMNOGRAPHY  through the [facility] ( [phone].    We have discussed weight loss , non supine position, good sleep hygiene, and  other interventions to foster good natural healthy sleep.  We have discussed behavioral modifications, as well.  After her study, she  could certainly try PAP therapy or out suitable alternatives        Follow up in about 5 weeks (around 8/3/2023), or Sleep study, CXR.    This note was prepared using voice recognition system and is likely to have sound alike errors that may have been overlooked even after proof reading.  Please call me with any questions    Discussed diagnosis, its evaluation, treatment and usual course. All questions answered.    Thank you for the courtesy of participating in the care of this patient    Mikael Perez MD      Personal Diagnostic Review  []  CXR    []  ECHO    []  ONSAT    []  6MWD    []  LABS    []  CHEST CT    []  PET CT    []  Biopsy results

## 2023-07-06 ENCOUNTER — HOSPITAL ENCOUNTER (OUTPATIENT)
Dept: RADIOLOGY | Facility: HOSPITAL | Age: 43
Discharge: HOME | End: 2023-07-06
Attending: INTERNAL MEDICINE
Payer: COMMERCIAL

## 2023-07-06 DIAGNOSIS — F17.200 NICOTINE DEPENDENCE, UNCOMPLICATED, UNSPECIFIED NICOTINE PRODUCT TYPE: ICD-10-CM

## 2023-07-06 PROCEDURE — 71046 XR CHEST PA AND LATERAL: ICD-10-PCS | Mod: 26,,, | Performed by: RADIOLOGY

## 2023-07-06 PROCEDURE — 71046 X-RAY EXAM CHEST 2 VIEWS: CPT | Mod: 26,,, | Performed by: RADIOLOGY

## 2023-07-06 PROCEDURE — 71046 X-RAY EXAM CHEST 2 VIEWS: CPT | Mod: TC

## 2023-08-02 ENCOUNTER — HOSPITAL ENCOUNTER (OUTPATIENT)
Dept: SLEEP MEDICINE | Facility: HOSPITAL | Age: 43
Discharge: HOME | End: 2023-08-02
Attending: INTERNAL MEDICINE
Payer: COMMERCIAL

## 2023-08-02 DIAGNOSIS — G47.33 OSA (OBSTRUCTIVE SLEEP APNEA): ICD-10-CM

## 2023-08-02 PROCEDURE — 95806 SLEEP STUDY UNATT&RESP EFFT: CPT | Performed by: INTERNAL MEDICINE

## 2023-08-02 NOTE — Clinical Note
1 night study MILD/BORDERLINE OBSTRUCTIVE SLEEP APNEA with overall AHI 6.7/hr (45 events): night #1 Oxygen desaturation: 91%. SpO2 between 90% to 94% for 3 hr 43 min. Patient snored 94% time above 50 . Heart rate range: 52 bpm - 86bpm REC's: Therapy with APAP at 6-12 cm WP using mask of choice with heated humidification is an option. Please refer to sleep disorders clinic Weight loss/management. with regular exercise per direction of physician. Avoid drowsy driving. Follow up in sleep clinic to maximize adherence and ensure resolution of symptoms. I

## 2023-08-06 PROCEDURE — 95806 SLEEP STUDY UNATT&RESP EFFT: CPT | Mod: 26,,, | Performed by: INTERNAL MEDICINE

## 2023-08-06 PROCEDURE — 95806 PR SLEEP STUDY, UNATTENDED, SIMUL RECORD HR/O2 SAT/RESP FLOW/RESP EFFT: ICD-10-PCS | Mod: 26,,, | Performed by: INTERNAL MEDICINE

## 2023-08-06 NOTE — PROCEDURES
"1 night study  MILD/BORDERLINE OBSTRUCTIVE SLEEP APNEA with overall AHI 6.7/hr (45 events):  night #1  Oxygen desaturation: 91%. SpO2 between 90% to 94% for 3 hr 43 min.  Patient snored 94% time above 50 .  Heart rate range: 52 bpm - 86bpm  REC's:  Therapy with APAP at 6-12 cm WP using mask of choice with heated humidification is an option.  Please refer to sleep disorders clinic  Weight loss/management. with regular exercise per direction of physician.  Avoid drowsy driving.  Follow up in sleep clinic to maximize adherence and ensure resolution of symptoms.  I    Dear Mikael Perez MD  [id number] [facility]  [place] [id number]/Tiago Heredia MD         The sleep study that you ordered is complete.  You have ordered sleep LAB services to perform the sleep study for Reynaldo Pinto III .      Please find Sleep Study result in  the "Media tab" of Chart Review menu.        You can look  for the report in the  Media by the document type "Sleep Study Documents". Alphabetizing  "Document type" column helps to find the SLEEP STUDY report  Faster.       As the ordering provider, you are responsible for reviewing the results and implementing a treatment plan with your patient.    If you need a Sleep Medicine provider to explain the sleep study findings and arrange treatment for the patient, please refer patient for consultation to our Sleep Clinic via [facility] with Ambulatory Consult Sleep.     To do that please place an order for an  "Ambulatory Consult Sleep" -  order , it will go to our clinic work queue for our staff  to contact the patient for an appointment.      For any questions, please contact our sleep lab  staff at [phone] to talk to clinical staff          Mikael Perez MD   "

## 2023-08-07 DIAGNOSIS — N52.9 ERECTILE DYSFUNCTION, UNSPECIFIED ERECTILE DYSFUNCTION TYPE: ICD-10-CM

## 2023-08-08 RX ORDER — TADALAFIL 5 MG/1
5 TABLET ORAL DAILY PRN
Qty: 30 TABLET | Refills: 11 | Status: SHIPPED | OUTPATIENT
Start: 2023-08-08 | End: 2023-10-01 | Stop reason: SDUPTHER

## 2023-08-23 ENCOUNTER — TELEPHONE (OUTPATIENT)
Dept: PULMONOLOGY | Facility: CLINIC | Age: 43
End: 2023-08-23
Payer: COMMERCIAL

## 2023-08-23 NOTE — TELEPHONE ENCOUNTER
----- Message from Mikael Perez MD sent at 8/6/2023  1:40 PM CDT -----    Please let the patient know that sleep study has been completed.  Please send a copy of the report to the patient.  Please arrange an appointment for patient to follow up either with me   to review the results and initiate treatment.

## 2023-08-30 ENCOUNTER — OFFICE VISIT (OUTPATIENT)
Dept: PULMONOLOGY | Facility: CLINIC | Age: 43
End: 2023-08-30
Payer: COMMERCIAL

## 2023-08-30 VITALS — HEIGHT: 72 IN | WEIGHT: 197 LBS | BODY MASS INDEX: 26.68 KG/M2

## 2023-08-30 DIAGNOSIS — Z72.0 TOBACCO USER: ICD-10-CM

## 2023-08-30 DIAGNOSIS — G47.33 OSA (OBSTRUCTIVE SLEEP APNEA): Primary | ICD-10-CM

## 2023-08-30 PROCEDURE — 1159F PR MEDICATION LIST DOCUMENTED IN MEDICAL RECORD: ICD-10-PCS | Mod: CPTII,95,, | Performed by: INTERNAL MEDICINE

## 2023-08-30 PROCEDURE — 99213 OFFICE O/P EST LOW 20 MIN: CPT | Mod: 95,,, | Performed by: INTERNAL MEDICINE

## 2023-08-30 PROCEDURE — 3008F PR BODY MASS INDEX (BMI) DOCUMENTED: ICD-10-PCS | Mod: CPTII,95,, | Performed by: INTERNAL MEDICINE

## 2023-08-30 PROCEDURE — 99213 PR OFFICE/OUTPT VISIT, EST, LEVL III, 20-29 MIN: ICD-10-PCS | Mod: 95,,, | Performed by: INTERNAL MEDICINE

## 2023-08-30 PROCEDURE — 1160F PR REVIEW ALL MEDS BY PRESCRIBER/CLIN PHARMACIST DOCUMENTED: ICD-10-PCS | Mod: CPTII,95,, | Performed by: INTERNAL MEDICINE

## 2023-08-30 PROCEDURE — 1159F MED LIST DOCD IN RCRD: CPT | Mod: CPTII,95,, | Performed by: INTERNAL MEDICINE

## 2023-08-30 PROCEDURE — 3008F BODY MASS INDEX DOCD: CPT | Mod: CPTII,95,, | Performed by: INTERNAL MEDICINE

## 2023-08-30 PROCEDURE — 1160F RVW MEDS BY RX/DR IN RCRD: CPT | Mod: CPTII,95,, | Performed by: INTERNAL MEDICINE

## 2023-08-30 NOTE — PATIENT INSTRUCTIONS
We are starting you on PAP therapy in order to treat your obstructive sleep apnea. In my experience, the best ways to use this therapy is to minimize the irritation and maximize the daytime benefit. Please do the following:    -Use the PAP machine whenever you are sleeping at night, or during naps    -If you have some trouble with the machine, please call your home care company the next day: Your homecare company is either [facility]: refer to sticker on device    -If they are not able to solve your problem, please call me, and I will see what I can do. I can be reached at [phone], through my nurse, Joan    -If you share the bed with someone, ask them to be on the lookout for you snoring or stopping breathing, with the machine on. If you do, please let me know before our next appointment. If you had these complaints, prior to use of PAP, you will still have them on nights where you are not using the machine    -Finally, in order to verify that you are getting a adequate amount of time on the PAP machine I have requested that your home care company download information from it. This is typically done by removing a card from your machine and taking it by the homecare company. They should contact you several weeks before our next appointment to get this information. It is very important that we have this information before our next visit, in fact it is required by medicare or your insurance for your CPAP to be paid for, and unfortunately we do not have a  here in the office so you will need to get the card to them at least several days before our appointment.

## 2023-08-30 NOTE — PROGRESS NOTES
The patient location is: [facility]  The chief complaint leading to consultation is: MOE    Visit type: audiovisual    Face to Face time with patient: 9 min  40 minutes of total time spent on the encounter, which includes face to face time and non-face to face time preparing to see the patient (eg, review of tests), Obtaining and/or reviewing separately obtained history, Documenting clinical information in the electronic or other health record, Independently interpreting results (not separately reported) and communicating results to the patient/family/caregiver, or Care coordination (not separately reported).         Each patient to whom he or she provides medical services by telemedicine is:  (1) informed of the relationship between the physician and patient and the respective role of any other health care provider with respect to management of the patient; and (2) notified that he or she may decline to receive medical services by telemedicine and may withdraw from such care at any time.    Notes:                                               Pulmonary Outpatient  Visit     Subjective:       Patient ID: Reynaldo Pinto III is a 43 y.o. male.    Social History     Tobacco Use   Smoking Status Former    Current packs/day: 0.00    Average packs/day: 2.0 packs/day for 18.4 years (36.8 ttl pk-yrs)    Types: Cigarettes, Vaping with nicotine    Start date: 2002    Quit date: 5/25/2020    Years since quitting: 3.2   Smokeless Tobacco Never            Chief Complaint: Apnea      Reynaldo Pinto III is 43 y.o.  Referred by Tiago Heredia MD  HSAT: mild MOE  Sleep issues: Wife concern snoring and gasping for Air  2 years  Worse  Bed time: 9 pm  Waketime: 3-4 am  Occupation: construction: [employer]  No sleeping pills  OTC chewables with melatonin botanicals  Sleepy and tired in day time  Naps when gets home 1 hrs                      Review of Systems   Respiratory:  Positive for apnea, snoring and  somnolence.        Outpatient Encounter Medications as of 8/30/2023   Medication Sig Dispense Refill    tadalafiL (CIALIS) 5 MG tablet Take 1 tablet (5 mg total) by mouth daily as needed for Erectile Dysfunction. 30 tablet 11    hydrocortisone (ANUSOL-HC) 2.5 % rectal cream Place rectally 2 (two) times daily. (Patient not taking: Reported on 6/9/2023) 28 g 2    nicotine (NICODERM CQ) 21 mg/24 hr Place 1 patch onto the skin once daily. (Patient not taking: Reported on 1/25/2022)       No facility-administered encounter medications on file as of 8/30/2023.       The following portions of the patient's history were reviewed and updated as appropriate: He  has a past medical history of Addiction to drug, ADHD (attention deficit hyperactivity disorder), inattentive type, History of psychiatric hospitalization, and Withdrawal symptoms, alcohol.  He does not have any pertinent problems on file.  He  has a past surgical history that includes Cyst Removal; Hernia repair; and Armstrong tooth extraction.  His family history includes Cancer (age of onset: 45) in his father; Cancer (age of onset: 50) in his mother; Muscular dystrophy (age of onset: 42) in his father; No Known Problems in his sister and sister.  He  reports that he quit smoking about 3 years ago. His smoking use included cigarettes and vaping with nicotine. He started smoking about 21 years ago. He has a 36.8 pack-year smoking history. He has never used smokeless tobacco. He reports that he does not currently use drugs after having used the following drugs: Methamphetamines. He reports that he does not drink alcohol.  He has a current medication list which includes the following prescription(s): tadalafil, hydrocortisone, and nicotine.  Current Outpatient Medications on File Prior to Visit   Medication Sig Dispense Refill    tadalafiL (CIALIS) 5 MG tablet Take 1 tablet (5 mg total) by mouth daily as needed for Erectile Dysfunction. 30 tablet 11    hydrocortisone  (ANUSOL-HC) 2.5 % rectal cream Place rectally 2 (two) times daily. (Patient not taking: Reported on 6/9/2023) 28 g 2    nicotine (NICODERM CQ) 21 mg/24 hr Place 1 patch onto the skin once daily. (Patient not taking: Reported on 1/25/2022)       No current facility-administered medications on file prior to visit.     He has No Known Allergies..      BP Readings from Last 3 Encounters:   06/09/23 118/80   02/24/22 125/77   01/25/22 102/74     Snoring / Sleep:     [place] Questionnaire (validated MOE screening questionnaire)    yes -- Snoring/apnea    yes -- Fatigue    Body mass index is 26.72 kg/m².  (>25 is overweight, >30 is obese)    Blood Pressure = normal blood pressure  (PreHTN 120-139/80-89, Stg1 140-159/90-99, Stg2 >160/>100)  Qu[place]by = 2 of three MOE categories are positive (high risk is 2-3 positive categories)     [place] Sleepiness Scale TOTAL =        8/30/2023     2:55 PM   EPWORTH SLEEPINESS SCALE   Sitting and reading 2   Watching TV 2   Sitting, inactive in a public place (e.g. a theatre or a meeting) 0   As a passenger in a car for an hour without a break 0   Lying down to rest in the afternoon when circumstances permit 3   Sitting and talking to someone 0   Sitting quietly after a lunch without alcohol 0   In a car, while stopped for a few minutes in traffic 0   Total score 7      (validated sleepiness questionnaire with a higher score indicating greater sleepiness; range 0-24)  No flowsheet data found.    STOP-Bang Questionnaire (validated MOE screening questionnaire)  Negative unless checked off.  [x] Snoring    [x]  Tired/Fatigued/Sleepy  [x] Obstruction (apneas/choking)  [] Pressure (HTN)  [] BMI >35  [] Age >50  [] Neck >40 cm  [x] Gender male   STOP-Bang = 4 (low risk 0-2,high risk 3-8)           MMRC Dyspnea Scale (4 is worst)     [x] MMRC 0: Dyspneic on strenuous excercise (0 points)    [] MMRC 1: Dyspneic on walking a slight hill (0 points)    [] MMRC 2: Dyspneic on walking level ground; must  stop occasionally due to breathlessness (1 point)    [] MMRC 3: Must stop for breathlessness after walking 100 yards or after a few minutes (2 points)    [] MMRC 4: Cannot leave house; breathless on dressing/undressing (3 points)                         6/29/2023     3:54 PM   EPWORTH SLEEPINESS SCALE   Sitting and reading 0   Watching TV 1   Sitting, inactive in a public place (e.g. a theatre or a meeting) 0   As a passenger in a car for an hour without a break 0   Lying down to rest in the afternoon when circumstances permit 3   Sitting and talking to someone 0   Sitting quietly after a lunch without alcohol 0   In a car, while stopped for a few minutes in traffic 0   Total score 4                 Objective:     Vital Signs (Most Recent)  Height and Weight  Height: 6' (182.9 cm)  Weight: 89.4 kg (197 lb)  BSA (Calculated - sq m): 2.13 sq meters  BMI (Calculated): 26.7  Weight in (lb) to have BMI = 25: 183.9]  Wt Readings from Last 2 Encounters:   08/30/23 89.4 kg (197 lb)   06/29/23 87.5 kg (193 lb)       Physical Exam  Vitals and nursing note reviewed.   Constitutional:       Appearance: Normal appearance.   HENT:      Head: Normocephalic.   Eyes:      Pupils: Pupils are equal, round, and reactive to light.   Neurological:      General: No focal deficit present.      Mental Status: He is alert.          Laboratory  Lab Results   Component Value Date    WBC 5.13 01/29/2022    RBC 5.32 01/29/2022    HGB 16.6 01/29/2022    HCT 49.5 01/29/2022    MCV 93 01/29/2022    MCH 31.2 (H) 01/29/2022    MCHC 33.5 01/29/2022    RDW 12.7 01/29/2022     01/29/2022    MPV 10.4 01/29/2022    GRAN 2.7 01/29/2022    GRAN 52.0 01/29/2022    LYMPH 1.9 01/29/2022    LYMPH 36.3 01/29/2022    MONO 0.4 01/29/2022    MONO 7.4 01/29/2022    EOS 0.1 01/29/2022    BASO 0.05 01/29/2022    EOSINOPHIL 2.7 01/29/2022    BASOPHIL 1.0 01/29/2022       BMP  Lab Results   Component Value Date     01/29/2022    K 4.4 01/29/2022      "01/29/2022    CO2 24 01/29/2022    BUN 12 01/29/2022    CREATININE 0.8 01/29/2022    CALCIUM 9.2 01/29/2022    ANIONGAP 9 01/29/2022    ESTGFRAFRICA >60.0 01/29/2022    EGFRNONAA >60.0 01/29/2022    AST 25 01/29/2022    ALT 39 01/29/2022    PROT 7.4 01/29/2022          No results found for: "IGE"     No results found for: "ASPERGILLUS"  No results found for: "AFUMIGATUSCL"     No results found for: "ACE"     Diagnostic Results:  I have personally reviewed today the following studies:    X-Ray Chest PA And Lateral  Narrative: EXAMINATION:  XR CHEST PA AND LATERAL    CLINICAL HISTORY:  Nicotine dependence, unspecified, uncomplicated    TECHNIQUE:  PA and lateral views of the chest were performed.    COMPARISON:  None    FINDINGS:  Normal heart size.  Normal pulmonary vasculature.  No focal infiltrates, pleural effusions, or pneumothorax.  No acute bony findings.  Impression: No active cardiac or pulmonary findings    Electronically signed by: Alex Cooper  Date:    07/06/2023  Time:    15:45     1 night study  MILD/BORDERLINE OBSTRUCTIVE SLEEP APNEA with overall AHI 6.7/hr (45 events):  night #1  Oxygen desaturation: 91%. SpO2 between 90% to 94% for 3 hr 43 min.  Patient snored 94% time above 50 .  Heart rate range: 52 bpm - 86bpm  REC's:  Therapy with APAP at 6-12 cm WP using mask of choice with heated humidification is an option.  Please refer to sleep disorders clinic  Weight loss/management. with regular exercise per direction of physician.  Avoid drowsy driving.  Follow up in sleep clinic to maximize adherence and ensure resolution of symptoms.  I  Assessment/Plan:     Problem List Items Addressed This Visit       Tobacco user    MOE (obstructive sleep apnea) - Primary      Treatment Options for Sleep Disordered Breathing discussed:     []    Continuous Positive Airway Pressure (CPAP).  []    Surgical options  []    Oral appliances   []    Behavioral approaches.   []    Weight loss.   []    Avoiding alcohol " and sedative medication.  []    Treat other underlying medical conditions eg. nasal allergies  []     INSPIRE  []     Provent/Theravent        No follow-ups on file.    This note was prepared using voice recognition system and is likely to have sound alike errors that may have been overlooked even after proof reading.  Please call me with any questions    Discussed diagnosis, its evaluation, treatment and usual course. All questions answered.    Thank you for the courtesy of participating in the care of this patient    Mikael Perez MD      Personal Diagnostic Review  []  CXR    []  ECHO    []  ONSAT    []  6MWD    []  LABS    []  CHEST CT    []  PET CT    []  Biopsy results

## 2023-09-13 ENCOUNTER — PATIENT MESSAGE (OUTPATIENT)
Dept: PULMONOLOGY | Facility: CLINIC | Age: 43
End: 2023-09-13
Payer: COMMERCIAL

## 2023-09-18 ENCOUNTER — OFFICE VISIT (OUTPATIENT)
Dept: URGENT CARE | Facility: CLINIC | Age: 43
End: 2023-09-18
Payer: COMMERCIAL

## 2023-09-18 VITALS
SYSTOLIC BLOOD PRESSURE: 146 MMHG | DIASTOLIC BLOOD PRESSURE: 90 MMHG | TEMPERATURE: 99 F | HEART RATE: 75 BPM | RESPIRATION RATE: 16 BRPM | OXYGEN SATURATION: 96 % | BODY MASS INDEX: 26.72 KG/M2 | WEIGHT: 197 LBS

## 2023-09-18 DIAGNOSIS — J06.9 VIRAL URI WITH COUGH: Primary | ICD-10-CM

## 2023-09-18 LAB
CTP QC/QA: YES
SARS-COV-2 AG RESP QL IA.RAPID: NEGATIVE

## 2023-09-18 PROCEDURE — 99203 OFFICE O/P NEW LOW 30 MIN: CPT | Mod: S$GLB,,, | Performed by: NURSE PRACTITIONER

## 2023-09-18 PROCEDURE — 87811 SARS CORONAVIRUS 2 ANTIGEN POCT, MANUAL READ: ICD-10-PCS | Mod: QW,S$GLB,, | Performed by: NURSE PRACTITIONER

## 2023-09-18 PROCEDURE — 99203 PR OFFICE/OUTPT VISIT, NEW, LEVL III, 30-44 MIN: ICD-10-PCS | Mod: S$GLB,,, | Performed by: NURSE PRACTITIONER

## 2023-09-18 PROCEDURE — 87811 SARS-COV-2 COVID19 W/OPTIC: CPT | Mod: QW,S$GLB,, | Performed by: NURSE PRACTITIONER

## 2023-09-18 RX ORDER — PROMETHAZINE HYDROCHLORIDE AND DEXTROMETHORPHAN HYDROBROMIDE 6.25; 15 MG/5ML; MG/5ML
5 SYRUP ORAL EVERY 8 HOURS PRN
Qty: 118 ML | Refills: 0 | Status: SHIPPED | OUTPATIENT
Start: 2023-09-18 | End: 2023-09-28

## 2023-09-18 RX ORDER — PREDNISONE 50 MG/1
50 TABLET ORAL DAILY
Qty: 5 TABLET | Refills: 0 | Status: SHIPPED | OUTPATIENT
Start: 2023-09-18 | End: 2023-09-23

## 2023-09-18 RX ORDER — BENZONATATE 200 MG/1
200 CAPSULE ORAL EVERY 8 HOURS PRN
Qty: 30 CAPSULE | Refills: 0 | Status: SHIPPED | OUTPATIENT
Start: 2023-09-18

## 2023-09-18 NOTE — PROGRESS NOTES
Subjective:      Patient ID: Reynaldo Pinto III is a 43 y.o. male.    Vitals:  weight is 89.4 kg (197 lb). His oral temperature is 99.1 °F (37.3 °C). His blood pressure is 146/90 (abnormal) and his pulse is 75. His respiration is 16 and oxygen saturation is 96%.     Chief Complaint: Cough (Coughing sore throat. Runny nose yellow mucus. Sinus pressure headache. - Entered by patient)    This is a 43 y.o. male who presents today with a chief complaint of cough, sore throat, chest/nasal congestion, sinus pressure, ear pain, and headache x 3 days     Home Tx: Used left over antibiotics (Amoxicillin), Benzonatate         Cough  The current episode started in the past 7 days. The problem has been gradually worsening. The cough is Productive of sputum. Associated symptoms include ear pain, headaches, nasal congestion and a sore throat. Pertinent negatives include no chills, ear congestion, fever, shortness of breath, sweats or wheezing. Associated symptoms comments: Chest pain from coughing. There is no history of asthma, bronchitis, COPD or pneumonia.       Constitution: Negative for chills and fever.   HENT:  Positive for ear pain and sore throat.    Respiratory:  Positive for cough. Negative for shortness of breath and wheezing.    Gastrointestinal:  Negative for nausea, vomiting and diarrhea.   Neurological:  Positive for headaches.      Objective:     Physical Exam   Constitutional: He is oriented to person, place, and time. He appears well-developed. He is cooperative.  Non-toxic appearance. He does not appear ill. No distress.   HENT:   Head: Normocephalic.   Ears:   Right Ear: Hearing, tympanic membrane, external ear and ear canal normal.   Left Ear: Hearing, tympanic membrane, external ear and ear canal normal.   Nose: Mucosal edema and congestion present. No rhinorrhea or nasal deformity. No epistaxis. Right sinus exhibits no maxillary sinus tenderness and no frontal sinus tenderness. Left sinus exhibits no  maxillary sinus tenderness and no frontal sinus tenderness.   Mouth/Throat: Uvula is midline and mucous membranes are normal. Mucous membranes are moist. No trismus in the jaw. Normal dentition. No uvula swelling. Posterior oropharyngeal erythema present. No oropharyngeal exudate or posterior oropharyngeal edema. Oropharynx is clear.   Eyes: Conjunctivae and lids are normal. No scleral icterus.   Neck: Trachea normal and phonation normal. Neck supple. No edema present. No erythema present. No neck rigidity present.   Cardiovascular: Normal rate, regular rhythm and normal heart sounds.   Pulmonary/Chest: Effort normal and breath sounds normal. No respiratory distress. He has no decreased breath sounds. He has no rhonchi.   Abdominal: Normal appearance.   Musculoskeletal: Normal range of motion.         General: No deformity. Normal range of motion.   Neurological: He is alert and oriented to person, place, and time. He exhibits normal muscle tone. Coordination normal.   Skin: Skin is warm, dry, intact, not diaphoretic and not pale.   Psychiatric: His speech is normal and behavior is normal. Judgment and thought content normal.   Nursing note and vitals reviewed.    Results for orders placed or performed in visit on 09/18/23   SARS Coronavirus 2 Antigen, POCT Manual Read   Result Value Ref Range    SARS Coronavirus 2 Antigen Negative Negative     Acceptable Yes         Assessment:     1. Viral URI with cough        Plan:       Viral URI with cough  -     SARS Coronavirus 2 Antigen, POCT Manual Read  -     benzonatate (TESSALON) 200 MG capsule; Take 1 capsule (200 mg total) by mouth every 8 (eight) hours as needed for Cough.  Dispense: 30 capsule; Refill: 0  -     promethazine-dextromethorphan (PROMETHAZINE-DM) 6.25-15 mg/5 mL Syrp; Take 5 mLs by mouth every 8 (eight) hours as needed (cough).  Dispense: 118 mL; Refill: 0  -     predniSONE (DELTASONE) 50 MG Tab; Take 1 tablet (50 mg total) by mouth once  daily. for 5 days  Dispense: 5 tablet; Refill: 0      Patient Instructions   Oral fluids  Rest  Steam (hot showers, hot tea)  Blow nose often  Avoid circulating air (such as ceiling fans) dries your airway  Avoid drinking cold drinks (worsens cough)  Avoid strong smells which could worsen cough (perfume, lotions, smoke...)  Therapeutic coughing to expel mucous  Sit in upright position often

## 2023-09-18 NOTE — LETTER
September 18, 2023      [facility]  [address]  [facility] [address]  Phone: [phone]  Fax: [phone]       Patient: Reynaldo Pinto   YOB: 1980  Date of Visit: 09/18/2023    To Whom It May Concern:    Michael Pinto  was at [facility] on 09/18/2023. The patient may return to work/school on 9/19/2023 with no restrictions. If you have any questions or concerns, or if I can be of further assistance, please do not hesitate to contact me.    Sincerely,    Julia Eller, NURIA-BC

## 2023-10-08 DIAGNOSIS — N52.9 ERECTILE DYSFUNCTION, UNSPECIFIED ERECTILE DYSFUNCTION TYPE: ICD-10-CM

## 2023-10-09 RX ORDER — TADALAFIL 5 MG/1
5 TABLET ORAL DAILY PRN
Qty: 30 TABLET | Refills: 11 | Status: SHIPPED | OUTPATIENT
Start: 2023-10-09 | End: 2023-11-05 | Stop reason: SDUPTHER

## 2023-10-09 NOTE — TELEPHONE ENCOUNTER
No care due was identified.  [facility] Embedded Care Due Messages. Reference number: [id number].   10/08/2023 9:08:31 PM CDT

## 2023-11-05 DIAGNOSIS — N52.9 ERECTILE DYSFUNCTION, UNSPECIFIED ERECTILE DYSFUNCTION TYPE: ICD-10-CM

## 2023-11-05 NOTE — TELEPHONE ENCOUNTER
No care due was identified.  [facility] Embedded Care Due Messages. Reference number: [id number].   11/05/2023 5:34:14 PM CST

## 2023-11-06 RX ORDER — TADALAFIL 5 MG/1
5 TABLET ORAL DAILY PRN
Qty: 30 TABLET | Refills: 11 | Status: SHIPPED | OUTPATIENT
Start: 2023-11-06

## 2023-11-29 ENCOUNTER — E-VISIT (OUTPATIENT)
Dept: PRIMARY CARE CLINIC | Facility: CLINIC | Age: 43
End: 2023-11-29
Payer: COMMERCIAL

## 2023-11-29 DIAGNOSIS — J06.9 URTI (ACUTE UPPER RESPIRATORY INFECTION): Primary | ICD-10-CM

## 2023-11-29 PROCEDURE — 99421 PR E&M, ONLINE DIGIT, EST, < 7 DAYS, 5-10 MINS: ICD-10-PCS | Mod: ,,, | Performed by: INTERNAL MEDICINE

## 2023-11-29 PROCEDURE — 99421 OL DIG E/M SVC 5-10 MIN: CPT | Mod: ,,, | Performed by: INTERNAL MEDICINE

## 2023-11-29 RX ORDER — METHYLPREDNISOLONE 4 MG/1
TABLET ORAL
Qty: 21 EACH | Refills: 0 | Status: SHIPPED | OUTPATIENT
Start: 2023-11-29 | End: 2023-12-20

## 2023-11-29 NOTE — PROGRESS NOTES
Patient ID: Reynaldo Pinto III is a 43 y.o. male.    Chief Complaint: URI (Entered automatically based on patient selection in Patient Portal.)    The patient initiated a request through [employer] on 11/29/2023 for evaluation and management with a chief complaint of URI (Entered automatically based on patient selection in Patient Portal.)     I evaluated the questionnaire submission on 11/29/2023.    [facility] Peq Evisit Upper Respitatory/Cough Questionnaire    11/29/2023  9:46 AM CST - Filed by Patient   Do you agree to participate in an E-Visit? Yes   If you have any of the following symptoms, please present to your local ER or call 911:  I acknowledge   What is the main issue that you would like for your doctor to address today? Coughing sore throat and sneezing   Are you able to take your vital signs? No   What symptoms do you currently have?  Cough;  Headache;  Nasal Congestion;  Runny nose;  Sore throat   Describe your cough: Productive (containing mucus)   Describe the mucus: Yellow;  Clear;  White   Have you had any of the following? None of the above   Have you ever smoked? I have smoked in the past   Have you had a fever? No   When did your symptoms first appear? 11/26/2023   In the last two weeks, have you been in close contact with someone who has COVID-19 or the Flu? No   In the last two weeks, have you worked or volunteered in a healthcare facility or as a ? Healthcare facilities include a hospital, medical or dental clinic, long-term care facility, or nursing home No   Do you live in a long-term care facility, nursing home, group home, or homeless shelter? No   List what you have done or taken to help your symptoms. Day and night quil   How severe are your symptoms? Moderate   Have your symptoms improved since they first appeared? Worse   Have you taken an at home Covid test? Yes   What were the results? Negative   Have you taken a Flu test? No   Have you been fully vaccinated for COVID?  (2 Pfizer, 2 Moderna or 1 Mitch & Mitch vaccine injections) No   Have you received your first dose of the Pfizer or Moderna COVID vaccine? No   Have you recieved a Flu shot? No   Do you have transportation to get tested for COVID if it is indicated and ordered for you at an [facility] location? No   Provide any information you feel is important to your history not asked above    Please attach any relevant images or files          Recent Labs Obtained:  No visits with results within 7 Day(s) from this visit.   Latest known visit with results is:   Office Visit on 09/18/2023   Component Date Value Ref Range Status    SARS Coronavirus 2 Antigen 09/18/2023 Negative  Negative Final     Acceptable 09/18/2023 Yes   Final       Encounter Diagnosis   Name Primary?    URTI (acute upper respiratory infection) Yes        No orders of the defined types were placed in this encounter.     Medications Ordered This Encounter   Medications    methylPREDNISolone (MEDROL DOSEPACK) 4 mg tablet     Sig: use as directed     Dispense:  21 each     Refill:  0        No follow-ups on file.      E-Visit Time Tracking:    Day 1 Time (in minutes): 5     Total Time (in minutes): 5

## 2024-06-04 DIAGNOSIS — J06.9 VIRAL URI WITH COUGH: ICD-10-CM

## 2024-06-04 RX ORDER — BENZONATATE 200 MG/1
200 CAPSULE ORAL EVERY 8 HOURS PRN
Qty: 30 CAPSULE | Refills: 0 | Status: SHIPPED | OUTPATIENT
Start: 2024-06-04

## 2025-06-25 ENCOUNTER — PATIENT OUTREACH (OUTPATIENT)
Dept: ADMINISTRATIVE | Facility: HOSPITAL | Age: 45
End: 2025-06-25
Payer: COMMERCIAL

## 2025-06-26 ENCOUNTER — OFFICE VISIT (OUTPATIENT)
Dept: PRIMARY CARE CLINIC | Facility: CLINIC | Age: 45
End: 2025-06-26
Payer: COMMERCIAL

## 2025-06-26 VITALS
BODY MASS INDEX: 29.41 KG/M2 | OXYGEN SATURATION: 96 % | HEIGHT: 72 IN | DIASTOLIC BLOOD PRESSURE: 72 MMHG | SYSTOLIC BLOOD PRESSURE: 136 MMHG | WEIGHT: 217.13 LBS | HEART RATE: 91 BPM

## 2025-06-26 DIAGNOSIS — K13.0 CYST OF LIP: ICD-10-CM

## 2025-06-26 DIAGNOSIS — K13.0 LIP LESION: Primary | ICD-10-CM

## 2025-06-26 DIAGNOSIS — Z00.00 ANNUAL PHYSICAL EXAM: ICD-10-CM

## 2025-06-26 PROCEDURE — 1159F MED LIST DOCD IN RCRD: CPT | Mod: CPTII,S$GLB,, | Performed by: NURSE PRACTITIONER

## 2025-06-26 PROCEDURE — 3008F BODY MASS INDEX DOCD: CPT | Mod: CPTII,S$GLB,, | Performed by: NURSE PRACTITIONER

## 2025-06-26 PROCEDURE — 99999 PR PBB SHADOW E&M-EST. PATIENT-LVL III: CPT | Mod: PBBFAC,,, | Performed by: NURSE PRACTITIONER

## 2025-06-26 PROCEDURE — 3075F SYST BP GE 130 - 139MM HG: CPT | Mod: CPTII,S$GLB,, | Performed by: NURSE PRACTITIONER

## 2025-06-26 PROCEDURE — 99396 PREV VISIT EST AGE 40-64: CPT | Mod: S$GLB,,, | Performed by: NURSE PRACTITIONER

## 2025-06-26 PROCEDURE — 3078F DIAST BP <80 MM HG: CPT | Mod: CPTII,S$GLB,, | Performed by: NURSE PRACTITIONER

## 2025-06-26 NOTE — PROGRESS NOTES
[facility] Note    Chief Complaint      Chief Complaint   Patient presents with    lip mass     Over a year    shin veins     Bilateral        History of Present Illness      Reynaldo Pinto III is a 45 y.o. male with chronic conditions of ADHD, who presents today for: has cyst on lip on left lower lip, has had for over 2 years. Has grow in size, nontender. Does not bleed. Dentist told him he should have it checked out but he forgot who he recommended.   Varicose veins in LLE.   Diet: cooks mostly at home. Drinks plenty of water.   Exercise: stays active with [employer] glass.   Quit smoking, started vaping a couple of years ago. No drug use. Does not drink and drive. Wears seatbelts.   UTD on immunizations.   Cscope and PSA due at age 47.     Past Medical History:  Past Medical History:   Diagnosis Date    Addiction to drug     ADHD (attention deficit hyperactivity disorder), inattentive type     Treated by Dr. Tao in past - records in media file    History of psychiatric hospitalization     Withdrawal symptoms, alcohol        Past Surgical History:   has a past surgical history that includes Cyst Removal; Hernia repair; and [place] tooth extraction.    Family History:  family history includes Arthritis in his mother; COPD in his father; Cancer (age of onset: 45) in his father; Cancer (age of onset: 50) in his mother; Heart disease in his father; Muscular dystrophy (age of onset: 42) in his father; No Known Problems in his sister and sister; Vision loss in his father.     Social History:  Social History[1]    Review of Systems   Constitutional:  Negative for chills and fever.   Respiratory:  Negative for cough and shortness of breath.    Cardiovascular:  Negative for chest pain and palpitations.   Gastrointestinal:  Negative for constipation, diarrhea, nausea and vomiting.   Genitourinary:  Negative for dysuria and hematuria.   Musculoskeletal:  Negative for falls.   Neurological:  Negative  for headaches.        Medications:  Encounter Medications[2]    Allergies:  Review of patient's allergies indicates:  No Known Allergies    Health Maintenance:  Immunization History   Administered Date(s) Administered    Hepatitis A / Hepatitis B 12/14/2018    Influenza - Quadrivalent - PF *Preferred* (6 months and older) 09/20/2016    Meningococcal Conjugate (MCV4P) 12/01/2008, 12/31/2008    Pneumococcal Polysaccharide - 23 Valent 06/16/2020    Td (ADULT) 12/01/2008    Tdap 12/31/2008, 05/17/2017      Health Maintenance   Topic Date Due    Hemoglobin A1c (Diabetic Prevention Screening)  Never done    COVID-19 Vaccine (1 - 2024-25 season) Never done    Influenza Vaccine (1) 09/01/2025    Lipid Panel  01/29/2027    TETANUS VACCINE  01/01/2029    RSV Vaccine (Age 60+ and Pregnant patients) (1 - 1-dose 75+ series) 01/30/2057    Hepatitis C Screening  Completed    HIV Screening  Completed    Pneumococcal Vaccines (Age 0-49)  Aged Out        Physical Exam      Vital Signs  Pulse: 91  SpO2: 96 %  BP: 136/72  BP Location: Right arm  Patient Position: Sitting  Height and Weight  Height: 6' (182.9 cm)  Weight: 98.5 kg (217 lb 2.5 oz)  BSA (Calculated - sq m): 2.24 sq meters  BMI (Calculated): 29.4  Weight in (lb) to have BMI = 25: 183.9]    Physical Exam  Constitutional:       Appearance: He is well-developed.   HENT:      Head: Normocephalic and atraumatic.      Mouth/Throat:      Comments: Cyst noted to bottom of lip. Nontender. Firm.<1cm.   Neck:      Thyroid: No thyromegaly.   Cardiovascular:      Rate and Rhythm: Normal rate and regular rhythm.      Heart sounds: No murmur heard.  Pulmonary:      Effort: Pulmonary effort is normal. No respiratory distress.      Breath sounds: Normal breath sounds.   Abdominal:      General: There is no distension.      Palpations: Abdomen is soft.      Tenderness: There is no abdominal tenderness.   Skin:     General: Skin is warm and dry.   Neurological:      Mental Status: He is alert  "and oriented to person, place, and time.   Psychiatric:         Behavior: Behavior normal.          Laboratory:  CBC:      CMP:        Invalid input(s): "CREATININ"  URINALYSIS:       LIPIDS:      TSH:      A1C:        Assessment/Plan     Reynaldo Pinto III is a 45 y.o.male with:    1. Annual physical exam  - CBC Auto Differential; Future  - Comprehensive Metabolic Panel; Future  - Lipid Panel; Future  - TSH; Future  - Hemoglobin A1C; Future  - counseled on diet and exercise  -counseled on preventative screening, cancer screening, and immunizations.     2. Lip lesion  - Ambulatory referral/consult to ENT; Future       Chronic conditions status updated as per HPI.  Other than changes above, cont current medications and maintain follow up with specialists.  No follow-ups on file.    No future appointments.    Adrienne Cotaya, FNP [facility]                       [1]   Social History  Tobacco Use    Smoking status: Former     Types: Vaping with nicotine    Smokeless tobacco: Never   Substance Use Topics    Alcohol use: Yes     Comment: A lot    Drug use: Yes     Types: Amphetamines, "Crack" cocaine, Cocaine, Hydrocodone, Marijuana, MDMA (Ecstacy), Methamphetamines, Oxycodone     Comment: I have taken turns with addiction to the drugs ive listed   [2]   Outpatient Encounter Medications as of 6/26/2025   Medication Sig Dispense Refill    [DISCONTINUED] benzonatate (TESSALON) 200 MG capsule Take 1 capsule (200 mg total) by mouth every 8 (eight) hours as needed for Cough. 30 capsule 0    [DISCONTINUED] hydrocortisone (ANUSOL-HC) 2.5 % rectal cream Place rectally 2 (two) times daily. (Patient not taking: Reported on 6/9/2023) 28 g 2    [DISCONTINUED] nicotine (NICODERM CQ) 21 mg/24 hr Place 1 patch onto the skin once daily. (Patient not taking: Reported on 1/25/2022)      [DISCONTINUED] tadalafiL (CIALIS) 5 MG tablet Take 1 tablet (5 mg total) by mouth daily as needed for Erectile Dysfunction. 30 tablet 11 "     No facility-administered encounter medications on file as of 6/26/2025.

## 2025-06-30 ENCOUNTER — PATIENT MESSAGE (OUTPATIENT)
Dept: PRIMARY CARE CLINIC | Facility: CLINIC | Age: 45
End: 2025-06-30
Payer: COMMERCIAL

## 2025-07-01 NOTE — TELEPHONE ENCOUNTER
"LOV 6/26/25 NP,Ke    Pt states:"awaiting information concerning this bump inside of my lip" Which kind of doctor should I see about getting it removed?   "

## 2025-07-12 ENCOUNTER — LAB VISIT (OUTPATIENT)
Dept: LAB | Facility: HOSPITAL | Age: 45
End: 2025-07-12
Payer: COMMERCIAL

## 2025-07-12 DIAGNOSIS — Z00.00 ANNUAL PHYSICAL EXAM: ICD-10-CM

## 2025-07-12 LAB
ABSOLUTE EOSINOPHIL (OHS): 0.17 K/UL
ABSOLUTE MONOCYTE (OHS): 0.35 K/UL (ref 0.3–1)
ABSOLUTE NEUTROPHIL COUNT (OHS): 3.48 K/UL (ref 1.8–7.7)
ALBUMIN SERPL BCP-MCNC: 4.2 G/DL (ref 3.5–5.2)
ALP SERPL-CCNC: 87 UNIT/L (ref 40–150)
ALT SERPL W/O P-5'-P-CCNC: 41 UNIT/L (ref 10–44)
ANION GAP (OHS): 7 MMOL/L (ref 8–16)
AST SERPL-CCNC: 27 UNIT/L (ref 11–45)
BASOPHILS # BLD AUTO: 0.05 K/UL
BASOPHILS NFR BLD AUTO: 0.9 %
BILIRUB SERPL-MCNC: 0.8 MG/DL (ref 0.1–1)
BUN SERPL-MCNC: 15 MG/DL (ref 6–20)
CALCIUM SERPL-MCNC: 9.4 MG/DL (ref 8.7–10.5)
CHLORIDE SERPL-SCNC: 107 MMOL/L (ref 95–110)
CHOLEST SERPL-MCNC: 175 MG/DL (ref 120–199)
CHOLEST/HDLC SERPL: 4.6 {RATIO} (ref 2–5)
CO2 SERPL-SCNC: 26 MMOL/L (ref 23–29)
CREAT SERPL-MCNC: 0.9 MG/DL (ref 0.5–1.4)
EAG (OHS): 97 MG/DL (ref 68–131)
ERYTHROCYTE [DISTWIDTH] IN BLOOD BY AUTOMATED COUNT: 12.8 % (ref 11.5–14.5)
GFR SERPLBLD CREATININE-BSD FMLA CKD-EPI: >60 ML/MIN/1.73/M2
GLUCOSE SERPL-MCNC: 100 MG/DL (ref 70–110)
HBA1C MFR BLD: 5 % (ref 4–5.6)
HCT VFR BLD AUTO: 47.4 % (ref 40–54)
HDLC SERPL-MCNC: 38 MG/DL (ref 40–75)
HDLC SERPL: 21.7 % (ref 20–50)
HGB BLD-MCNC: 16.6 GM/DL (ref 14–18)
IMM GRANULOCYTES # BLD AUTO: 0.02 K/UL (ref 0–0.04)
IMM GRANULOCYTES NFR BLD AUTO: 0.4 % (ref 0–0.5)
LDLC SERPL CALC-MCNC: 98.4 MG/DL (ref 63–159)
LYMPHOCYTES # BLD AUTO: 1.46 K/UL (ref 1–4.8)
MCH RBC QN AUTO: 31.6 PG (ref 27–31)
MCHC RBC AUTO-ENTMCNC: 35 G/DL (ref 32–36)
MCV RBC AUTO: 90 FL (ref 82–98)
NONHDLC SERPL-MCNC: 137 MG/DL
NUCLEATED RBC (/100WBC) (OHS): 0 /100 WBC
PLATELET # BLD AUTO: 256 K/UL (ref 150–450)
PMV BLD AUTO: 10.3 FL (ref 9.2–12.9)
POTASSIUM SERPL-SCNC: 5.4 MMOL/L (ref 3.5–5.1)
PROT SERPL-MCNC: 7.1 GM/DL (ref 6–8.4)
RBC # BLD AUTO: 5.25 M/UL (ref 4.6–6.2)
RELATIVE EOSINOPHIL (OHS): 3.1 %
RELATIVE LYMPHOCYTE (OHS): 26.4 % (ref 18–48)
RELATIVE MONOCYTE (OHS): 6.3 % (ref 4–15)
RELATIVE NEUTROPHIL (OHS): 62.9 % (ref 38–73)
SODIUM SERPL-SCNC: 140 MMOL/L (ref 136–145)
TRIGL SERPL-MCNC: 193 MG/DL (ref 30–150)
TSH SERPL-ACNC: 1.86 UIU/ML (ref 0.4–4)
WBC # BLD AUTO: 5.53 K/UL (ref 3.9–12.7)

## 2025-07-12 PROCEDURE — 80061 LIPID PANEL: CPT

## 2025-07-12 PROCEDURE — 84443 ASSAY THYROID STIM HORMONE: CPT

## 2025-07-12 PROCEDURE — 36415 COLL VENOUS BLD VENIPUNCTURE: CPT

## 2025-07-12 PROCEDURE — 83036 HEMOGLOBIN GLYCOSYLATED A1C: CPT

## 2025-07-12 PROCEDURE — 80053 COMPREHEN METABOLIC PANEL: CPT

## 2025-07-12 PROCEDURE — 85025 COMPLETE CBC W/AUTO DIFF WBC: CPT

## 2025-07-31 ENCOUNTER — OFFICE VISIT (OUTPATIENT)
Dept: OTOLARYNGOLOGY | Facility: CLINIC | Age: 45
End: 2025-07-31
Payer: COMMERCIAL

## 2025-07-31 DIAGNOSIS — K13.0 CYST OF LIP: ICD-10-CM

## 2025-07-31 PROCEDURE — 99999 PR PBB SHADOW E&M-EST. PATIENT-LVL I: CPT | Mod: PBBFAC,,, | Performed by: OTOLARYNGOLOGY

## 2025-07-31 NOTE — PROGRESS NOTES
Ear, Nose, & Throat  Otolaryngology - Head & Neck Surgery    Summary of Visit:  Reynaldo Pinto III was referred to me by Yolis Dempsey in consultation for lip mass    Subjective:     Chief Complaint: No chief complaint on file.      Reynaldo Pinto III is a 45 y.o. male who was referred to me by Yolis Dempsey in consultation for lip mass.  For the past 2 years, he has had a pedunculated mass on the mucosal surface of the lower lip.  He frequently bites this lesion.  It has not changed in size, been painful, or had bleeding other than with bite traumas.  He has no other associated symptoms.  Past Medical History  Active Ambulatory Problems     Diagnosis Date Noted    Erectile dysfunction 11/18/2015    ADHD (attention deficit hyperactivity disorder), inattentive type     Tobacco user 05/24/2016    Nicotine dependence 06/11/2018    MOE (obstructive sleep apnea) 06/29/2023     Resolved Ambulatory Problems     Diagnosis Date Noted    Severe depression 06/09/2018    Open wound of right index finger without damage to nail 06/11/2018     Past Medical History:   Diagnosis Date    Addiction to drug     History of psychiatric hospitalization     Withdrawal symptoms, alcohol        Past Surgical History  He has a past surgical history that includes Cyst Removal; Hernia repair; and [place] tooth extraction.    Past Surgical History:   Procedure Laterality Date    CYST REMOVAL      from around the left ear    HERNIA REPAIR      left inguinal herna    WISDOM TOOTH EXTRACTION          Family History  His family history includes Arthritis in his mother; COPD in his father; Cancer (age of onset: 45) in his father; Cancer (age of onset: 50) in his mother; Heart disease in his father; Muscular dystrophy (age of onset: 42) in his father; No Known Problems in his sister and sister; Vision loss in his father.    Social History  He reports that he has quit smoking. His smoking use included vaping with nicotine. He has never  "used smokeless tobacco. He reports current alcohol use. He reports current drug use. Drugs: Amphetamines, "Crack" cocaine, Cocaine, Hydrocodone, Marijuana, MDMA (Ecstacy), Methamphetamines, and Oxycodone.    Allergies  He has no known allergies.    Medications  He currently has no medications in their medication list.    ROS:  Pertinent positive and negative review of systems as noted in HPI.     Objective:     There were no vitals taken for this visit.   General Appearance:   Awake, Alert and Oriented. NAD. Appropriate affect and appearance      Neuro:   Spontaneous eye opening, appropriate verbal responses, follows commands  Pupils equal, round & brisk. EOMI, no proptosis  Face is symmetric, HB I, non-edematous bilaterally  Vision grossly intact, Hearing grossly intact     Head and Face:   skin is intact with no lesions noted.  Parotid and submandibular glands are symmetric and non-tender.      Ears:  Periauricular regions non-erythematous, non-fluctuanct non-tender  Pinna normal bilaterally, no skin lesions  EACs patent and without drainage bilaterally   Tympanic membranes are normal in appearance bilaterally.  No middle ear effusion noted bilaterally.    Nose:   External nose is symmetric, no skin lesions  Septum midline, No inferior turbinate hypertrophy, No polyps or rhinorrhea     OC/OP:  Tongue midline on extension, non-edematous, soft  Pedunculated soft mass of the mucosal surface of the lower lip to the left of the midline.  Soft palate symmetric, midline and without lesions or masses, tonsils symmetric  No masses or lesions of the visualized oropharynx     Neck:  Neck is symmetric, non-edematous, non-erythematous  Trachea is midline and easily palpable,  No palpable adenopathy or masses in levels I-VI  No thyroid nodules or masses, non-tender      Respiratory:  Normal work of breathing, no accessory muscle use, no stridor     Voice:  Normal vocal quality, volume and articulation    Data Review: "   LABS    IMAGING        AUDIO      Procedures:     Procedure: Lip biopsy  Anesthesia: 1% lidocaine with epinephrine  Procedure in detail:  1% lidocaine with epinephrine was injected into the base of the lesion circumferentially.  After allowing this to take effect, 15 blade was used to make an elliptical incision surrounding the lesion.  It was then excised with iris scissors.  Small portion of submucosal salivary tissue was removed as well.  Hemostasis was obtained with silver nitrate.  Interrupted 4-0 chromic horizontal mattress suture was used to close the incision.  He tolerated the procedure well.    Assessment:     1. Cyst of lip        Plan:     I had a long discussion with the patient regarding his condition and the further workup and management options.  The mass in question is consistent with a mucocele of the lower lip.  This was excised in its entirety without difficulty.  We will contact him with pathology results once available.    No orders of the defined types were placed in this encounter.         Problem List Items Addressed This Visit    None  Visit Diagnoses         Cyst of lip        Relevant Orders    Specimen to Pathology ENT